# Patient Record
Sex: FEMALE | Race: WHITE | Employment: FULL TIME | URBAN - NONMETROPOLITAN AREA
[De-identification: names, ages, dates, MRNs, and addresses within clinical notes are randomized per-mention and may not be internally consistent; named-entity substitution may affect disease eponyms.]

---

## 2018-11-08 LAB
CREATININE: 0.6 MG/DL
POTASSIUM (K+): 3.9

## 2019-08-20 ENCOUNTER — OFFICE VISIT (OUTPATIENT)
Dept: PRIMARY CARE CLINIC | Age: 39
End: 2019-08-20
Payer: COMMERCIAL

## 2019-08-20 VITALS
DIASTOLIC BLOOD PRESSURE: 90 MMHG | SYSTOLIC BLOOD PRESSURE: 145 MMHG | HEIGHT: 71 IN | OXYGEN SATURATION: 98 % | TEMPERATURE: 98.3 F | BODY MASS INDEX: 29.12 KG/M2 | HEART RATE: 77 BPM | WEIGHT: 208 LBS

## 2019-08-20 DIAGNOSIS — R53.83 FATIGUE DUE TO DEPRESSION: Primary | ICD-10-CM

## 2019-08-20 DIAGNOSIS — R63.5 WEIGHT GAIN: ICD-10-CM

## 2019-08-20 DIAGNOSIS — K21.9 GASTROESOPHAGEAL REFLUX DISEASE, ESOPHAGITIS PRESENCE NOT SPECIFIED: ICD-10-CM

## 2019-08-20 DIAGNOSIS — R68.84 JAW PAIN: ICD-10-CM

## 2019-08-20 DIAGNOSIS — F34.1 DYSTHYMIC DISORDER: ICD-10-CM

## 2019-08-20 DIAGNOSIS — E78.2 MIXED HYPERLIPIDEMIA: ICD-10-CM

## 2019-08-20 DIAGNOSIS — F32.A FATIGUE DUE TO DEPRESSION: Primary | ICD-10-CM

## 2019-08-20 DIAGNOSIS — F41.9 ANXIETY: ICD-10-CM

## 2019-08-20 DIAGNOSIS — J45.40 MODERATE PERSISTENT ASTHMA WITHOUT COMPLICATION: ICD-10-CM

## 2019-08-20 DIAGNOSIS — I87.2 VENOUS INSUFFICIENCY: ICD-10-CM

## 2019-08-20 DIAGNOSIS — E55.9 VITAMIN D DEFICIENCY: ICD-10-CM

## 2019-08-20 DIAGNOSIS — I10 ESSENTIAL HYPERTENSION: ICD-10-CM

## 2019-08-20 PROCEDURE — 99214 OFFICE O/P EST MOD 30 MIN: CPT | Performed by: FAMILY MEDICINE

## 2019-08-20 RX ORDER — OMEPRAZOLE 40 MG/1
40 CAPSULE, DELAYED RELEASE ORAL DAILY
Qty: 30 CAPSULE | Refills: 5 | Status: SHIPPED | OUTPATIENT
Start: 2019-08-20 | End: 2020-02-04 | Stop reason: SDUPTHER

## 2019-08-20 RX ORDER — ALBUTEROL SULFATE 90 UG/1
2 AEROSOL, METERED RESPIRATORY (INHALATION)
COMMUNITY
End: 2019-08-20 | Stop reason: SDUPTHER

## 2019-08-20 RX ORDER — ALBUTEROL SULFATE 90 UG/1
2 AEROSOL, METERED RESPIRATORY (INHALATION) EVERY 6 HOURS PRN
Qty: 1 INHALER | Refills: 5 | Status: SHIPPED | OUTPATIENT
Start: 2019-08-20 | End: 2020-08-13 | Stop reason: SDUPTHER

## 2019-08-20 RX ORDER — CYCLOBENZAPRINE HCL 10 MG
10 TABLET ORAL NIGHTLY PRN
Qty: 30 TABLET | Refills: 0 | Status: SHIPPED | OUTPATIENT
Start: 2019-08-20 | End: 2019-08-30

## 2019-08-20 RX ORDER — LISINOPRIL 10 MG/1
10 TABLET ORAL DAILY
Qty: 30 TABLET | Refills: 5 | Status: SHIPPED | OUTPATIENT
Start: 2019-08-20 | End: 2020-02-04 | Stop reason: SDUPTHER

## 2019-08-20 RX ORDER — BUPROPION HYDROCHLORIDE 200 MG/1
TABLET, EXTENDED RELEASE ORAL
Qty: 60 TABLET | Refills: 3 | Status: SHIPPED | OUTPATIENT
Start: 2019-08-20 | End: 2020-02-04 | Stop reason: SDUPTHER

## 2019-08-20 RX ORDER — ALBUTEROL SULFATE 2.5 MG/3ML
SOLUTION RESPIRATORY (INHALATION)
COMMUNITY
Start: 2019-02-22 | End: 2019-08-20

## 2019-08-20 RX ORDER — LISINOPRIL 10 MG/1
10 TABLET ORAL
COMMUNITY
Start: 2016-11-16 | End: 2019-08-20 | Stop reason: SDUPTHER

## 2019-08-20 RX ORDER — OMEPRAZOLE 40 MG/1
40 CAPSULE, DELAYED RELEASE ORAL
COMMUNITY
Start: 2016-11-16 | End: 2019-08-20 | Stop reason: SDUPTHER

## 2019-08-20 ASSESSMENT — ENCOUNTER SYMPTOMS
NAUSEA: 0
ABDOMINAL DISTENTION: 0
CONSTIPATION: 0
SHORTNESS OF BREATH: 0
COUGH: 0
CHEST TIGHTNESS: 0
SORE THROAT: 0
SINUS PRESSURE: 0
WHEEZING: 0
EYE REDNESS: 0
VOMITING: 0
COLOR CHANGE: 0
SINUS PAIN: 0
PHOTOPHOBIA: 0
ABDOMINAL PAIN: 0
BACK PAIN: 0
BLOOD IN STOOL: 0
EYE PAIN: 0
DIARRHEA: 0
EYE ITCHING: 0

## 2019-08-20 ASSESSMENT — PATIENT HEALTH QUESTIONNAIRE - PHQ9
2. FEELING DOWN, DEPRESSED OR HOPELESS: 0
SUM OF ALL RESPONSES TO PHQ QUESTIONS 1-9: 0
SUM OF ALL RESPONSES TO PHQ QUESTIONS 1-9: 0
1. LITTLE INTEREST OR PLEASURE IN DOING THINGS: 0
SUM OF ALL RESPONSES TO PHQ9 QUESTIONS 1 & 2: 0

## 2019-08-21 LAB
T4 FREE: 1.01
TSH SERPL DL<=0.05 MIU/L-ACNC: 4.43 UIU/ML

## 2019-08-23 DIAGNOSIS — E03.8 OTHER SPECIFIED HYPOTHYROIDISM: Primary | ICD-10-CM

## 2019-08-23 RX ORDER — LEVOTHYROXINE SODIUM 0.03 MG/1
25 TABLET ORAL DAILY
Qty: 30 TABLET | Refills: 5 | Status: SHIPPED | OUTPATIENT
Start: 2019-08-23 | End: 2020-02-04 | Stop reason: SDUPTHER

## 2019-09-05 ENCOUNTER — TELEPHONE (OUTPATIENT)
Dept: PRIMARY CARE CLINIC | Age: 39
End: 2019-09-05

## 2019-09-05 DIAGNOSIS — E03.8 OTHER SPECIFIED HYPOTHYROIDISM: ICD-10-CM

## 2019-09-06 DIAGNOSIS — E03.8 OTHER SPECIFIED HYPOTHYROIDISM: Primary | ICD-10-CM

## 2019-09-25 RX ORDER — PREDNISONE 20 MG/1
20 TABLET ORAL DAILY
COMMUNITY
End: 2019-10-02

## 2019-09-25 RX ORDER — AZITHROMYCIN 250 MG/1
250 TABLET, FILM COATED ORAL DAILY
COMMUNITY
End: 2019-10-02

## 2019-09-25 RX ORDER — CEFDINIR 300 MG/1
300 CAPSULE ORAL 2 TIMES DAILY
COMMUNITY
End: 2019-10-02

## 2019-09-30 ENCOUNTER — TELEPHONE (OUTPATIENT)
Dept: PRIMARY CARE CLINIC | Age: 39
End: 2019-09-30

## 2019-09-30 DIAGNOSIS — E03.9 HYPOTHYROIDISM, UNSPECIFIED TYPE: Primary | ICD-10-CM

## 2019-09-30 RX ORDER — MULTIVITAMIN
1 CAPSULE ORAL DAILY
COMMUNITY
End: 2019-10-02

## 2019-10-02 ENCOUNTER — OFFICE VISIT (OUTPATIENT)
Dept: PRIMARY CARE CLINIC | Age: 39
End: 2019-10-02
Payer: COMMERCIAL

## 2019-10-02 VITALS
WEIGHT: 197.8 LBS | BODY MASS INDEX: 27.59 KG/M2 | DIASTOLIC BLOOD PRESSURE: 78 MMHG | HEART RATE: 76 BPM | TEMPERATURE: 98.2 F | OXYGEN SATURATION: 98 % | SYSTOLIC BLOOD PRESSURE: 116 MMHG

## 2019-10-02 DIAGNOSIS — K21.9 GASTROESOPHAGEAL REFLUX DISEASE, ESOPHAGITIS PRESENCE NOT SPECIFIED: ICD-10-CM

## 2019-10-02 DIAGNOSIS — J45.40 MODERATE PERSISTENT ASTHMA WITHOUT COMPLICATION: ICD-10-CM

## 2019-10-02 DIAGNOSIS — J40 BRONCHITIS: ICD-10-CM

## 2019-10-02 DIAGNOSIS — E55.9 VITAMIN D DEFICIENCY: ICD-10-CM

## 2019-10-02 DIAGNOSIS — F34.1 DYSTHYMIC DISORDER: ICD-10-CM

## 2019-10-02 DIAGNOSIS — I10 ESSENTIAL HYPERTENSION: Primary | ICD-10-CM

## 2019-10-02 DIAGNOSIS — I87.2 VENOUS INSUFFICIENCY: ICD-10-CM

## 2019-10-02 DIAGNOSIS — J01.80 ACUTE NON-RECURRENT SINUSITIS OF OTHER SINUS: ICD-10-CM

## 2019-10-02 DIAGNOSIS — E03.8 OTHER SPECIFIED HYPOTHYROIDISM: ICD-10-CM

## 2019-10-02 DIAGNOSIS — R63.5 WEIGHT GAIN: ICD-10-CM

## 2019-10-02 DIAGNOSIS — F41.9 ANXIETY: ICD-10-CM

## 2019-10-02 DIAGNOSIS — E78.2 MIXED HYPERLIPIDEMIA: ICD-10-CM

## 2019-10-02 DIAGNOSIS — E03.9 HYPOTHYROIDISM, UNSPECIFIED TYPE: ICD-10-CM

## 2019-10-02 PROBLEM — J01.90 ACUTE INFECTION OF NASAL SINUS: Status: ACTIVE | Noted: 2019-10-02

## 2019-10-02 LAB
T4 FREE: NORMAL
TSH SERPL DL<=0.05 MIU/L-ACNC: NORMAL UIU/ML

## 2019-10-02 PROCEDURE — 99214 OFFICE O/P EST MOD 30 MIN: CPT | Performed by: FAMILY MEDICINE

## 2019-10-02 PROCEDURE — G8484 FLU IMMUNIZE NO ADMIN: HCPCS | Performed by: FAMILY MEDICINE

## 2019-10-02 PROCEDURE — 1036F TOBACCO NON-USER: CPT | Performed by: FAMILY MEDICINE

## 2019-10-02 PROCEDURE — G8427 DOCREV CUR MEDS BY ELIG CLIN: HCPCS | Performed by: FAMILY MEDICINE

## 2019-10-02 PROCEDURE — G8419 CALC BMI OUT NRM PARAM NOF/U: HCPCS | Performed by: FAMILY MEDICINE

## 2019-10-02 RX ORDER — AZITHROMYCIN 250 MG/1
250 TABLET, FILM COATED ORAL SEE ADMIN INSTRUCTIONS
Qty: 6 TABLET | Refills: 0 | Status: SHIPPED | OUTPATIENT
Start: 2019-10-02 | End: 2019-10-02 | Stop reason: SDUPTHER

## 2019-10-02 RX ORDER — METHYLPREDNISOLONE 4 MG/1
TABLET ORAL
Qty: 21 TABLET | Refills: 0 | Status: SHIPPED | OUTPATIENT
Start: 2019-10-02 | End: 2019-10-02 | Stop reason: SDUPTHER

## 2019-10-02 RX ORDER — AZITHROMYCIN 250 MG/1
250 TABLET, FILM COATED ORAL SEE ADMIN INSTRUCTIONS
Qty: 6 TABLET | Refills: 0 | Status: SHIPPED | OUTPATIENT
Start: 2019-10-02 | End: 2019-10-03 | Stop reason: SDUPTHER

## 2019-10-02 RX ORDER — METHYLPREDNISOLONE 4 MG/1
TABLET ORAL
Qty: 21 TABLET | Refills: 0 | Status: SHIPPED | OUTPATIENT
Start: 2019-10-02 | End: 2019-10-03 | Stop reason: SDUPTHER

## 2019-10-02 ASSESSMENT — ENCOUNTER SYMPTOMS
NAUSEA: 0
DIARRHEA: 0
EYE ITCHING: 0
VOMITING: 0
EYE PAIN: 0
PHOTOPHOBIA: 0
CONSTIPATION: 0
SINUS PAIN: 1
SHORTNESS OF BREATH: 0
CHEST TIGHTNESS: 1
COUGH: 1
BACK PAIN: 0
WHEEZING: 0
BLOOD IN STOOL: 0
ABDOMINAL PAIN: 0
SORE THROAT: 0
ABDOMINAL DISTENTION: 0
SINUS PRESSURE: 1
EYE REDNESS: 0
COLOR CHANGE: 0

## 2019-10-03 DIAGNOSIS — J40 BRONCHITIS: ICD-10-CM

## 2019-10-03 DIAGNOSIS — J01.80 ACUTE NON-RECURRENT SINUSITIS OF OTHER SINUS: ICD-10-CM

## 2019-10-03 RX ORDER — METHYLPREDNISOLONE 4 MG/1
TABLET ORAL
Qty: 21 TABLET | Refills: 0 | Status: SHIPPED | OUTPATIENT
Start: 2019-10-03 | End: 2019-10-09

## 2019-10-03 RX ORDER — AZITHROMYCIN 250 MG/1
250 TABLET, FILM COATED ORAL SEE ADMIN INSTRUCTIONS
Qty: 6 TABLET | Refills: 0 | Status: SHIPPED | OUTPATIENT
Start: 2019-10-03 | End: 2019-10-08

## 2019-11-06 LAB
ALBUMIN SERPL-MCNC: 4.3 G/DL
ALP BLD-CCNC: 66 U/L
ALT SERPL-CCNC: 29 U/L
ANION GAP SERPL CALCULATED.3IONS-SCNC: 1.4 MMOL/L
AST SERPL-CCNC: 21 U/L
BILIRUB SERPL-MCNC: 0.7 MG/DL (ref 0.1–1.4)
BUN BLDV-MCNC: 17 MG/DL
CALCIUM SERPL-MCNC: 9.2 MG/DL
CHLORIDE BLD-SCNC: 105 MMOL/L
CO2: 25 MMOL/L
CREAT SERPL-MCNC: 0.8 MG/DL
GFR CALCULATED: 80
GLUCOSE BLD-MCNC: 91 MG/DL
POTASSIUM SERPL-SCNC: 4.1 MMOL/L
SODIUM BLD-SCNC: 138 MMOL/L
TOTAL PROTEIN: 7.3

## 2019-12-23 ENCOUNTER — OFFICE VISIT (OUTPATIENT)
Dept: FAMILY MEDICINE CLINIC | Age: 39
End: 2019-12-23
Payer: COMMERCIAL

## 2019-12-23 VITALS
HEART RATE: 79 BPM | OXYGEN SATURATION: 98 % | HEIGHT: 71 IN | BODY MASS INDEX: 28.14 KG/M2 | WEIGHT: 201 LBS | TEMPERATURE: 98.7 F | RESPIRATION RATE: 18 BRPM | DIASTOLIC BLOOD PRESSURE: 78 MMHG | SYSTOLIC BLOOD PRESSURE: 130 MMHG

## 2019-12-23 DIAGNOSIS — J06.9 UPPER RESPIRATORY TRACT INFECTION, UNSPECIFIED TYPE: Primary | ICD-10-CM

## 2019-12-23 PROCEDURE — 1036F TOBACCO NON-USER: CPT | Performed by: PHYSICIAN ASSISTANT

## 2019-12-23 PROCEDURE — G8419 CALC BMI OUT NRM PARAM NOF/U: HCPCS | Performed by: PHYSICIAN ASSISTANT

## 2019-12-23 PROCEDURE — 99213 OFFICE O/P EST LOW 20 MIN: CPT | Performed by: PHYSICIAN ASSISTANT

## 2019-12-23 PROCEDURE — G8484 FLU IMMUNIZE NO ADMIN: HCPCS | Performed by: PHYSICIAN ASSISTANT

## 2019-12-23 PROCEDURE — G8427 DOCREV CUR MEDS BY ELIG CLIN: HCPCS | Performed by: PHYSICIAN ASSISTANT

## 2019-12-23 RX ORDER — AZITHROMYCIN 250 MG/1
250 TABLET, FILM COATED ORAL SEE ADMIN INSTRUCTIONS
Qty: 6 TABLET | Refills: 0 | Status: SHIPPED | OUTPATIENT
Start: 2019-12-23 | End: 2019-12-28

## 2020-02-04 ENCOUNTER — OFFICE VISIT (OUTPATIENT)
Dept: PRIMARY CARE CLINIC | Age: 40
End: 2020-02-04
Payer: COMMERCIAL

## 2020-02-04 VITALS
OXYGEN SATURATION: 97 % | TEMPERATURE: 97.5 F | BODY MASS INDEX: 28.56 KG/M2 | DIASTOLIC BLOOD PRESSURE: 70 MMHG | HEART RATE: 85 BPM | WEIGHT: 204 LBS | SYSTOLIC BLOOD PRESSURE: 110 MMHG | RESPIRATION RATE: 18 BRPM | HEIGHT: 71 IN

## 2020-02-04 PROBLEM — S60.452A FOREIGN BODY OF RIGHT MIDDLE FINGER: Status: ACTIVE | Noted: 2020-02-04

## 2020-02-04 PROCEDURE — 99215 OFFICE O/P EST HI 40 MIN: CPT | Performed by: FAMILY MEDICINE

## 2020-02-04 PROCEDURE — G8419 CALC BMI OUT NRM PARAM NOF/U: HCPCS | Performed by: FAMILY MEDICINE

## 2020-02-04 PROCEDURE — G8427 DOCREV CUR MEDS BY ELIG CLIN: HCPCS | Performed by: FAMILY MEDICINE

## 2020-02-04 PROCEDURE — 1036F TOBACCO NON-USER: CPT | Performed by: FAMILY MEDICINE

## 2020-02-04 PROCEDURE — G8484 FLU IMMUNIZE NO ADMIN: HCPCS | Performed by: FAMILY MEDICINE

## 2020-02-04 RX ORDER — ALPRAZOLAM 0.5 MG/1
TABLET ORAL
Qty: 4 TABLET | Refills: 0 | Status: SHIPPED | OUTPATIENT
Start: 2020-02-04 | End: 2020-02-06

## 2020-02-04 RX ORDER — OMEPRAZOLE 40 MG/1
40 CAPSULE, DELAYED RELEASE ORAL DAILY
Qty: 30 CAPSULE | Refills: 5 | Status: SHIPPED | OUTPATIENT
Start: 2020-02-04 | End: 2020-08-13 | Stop reason: SDUPTHER

## 2020-02-04 RX ORDER — LEVOTHYROXINE SODIUM 0.03 MG/1
25 TABLET ORAL DAILY
Qty: 30 TABLET | Refills: 5 | Status: SHIPPED | OUTPATIENT
Start: 2020-02-04 | End: 2020-08-13 | Stop reason: SDUPTHER

## 2020-02-04 RX ORDER — LISINOPRIL 10 MG/1
10 TABLET ORAL DAILY
Qty: 30 TABLET | Refills: 5 | Status: SHIPPED | OUTPATIENT
Start: 2020-02-04 | End: 2020-08-13 | Stop reason: SDUPTHER

## 2020-02-04 RX ORDER — BUPROPION HYDROCHLORIDE 200 MG/1
TABLET, EXTENDED RELEASE ORAL
Qty: 60 TABLET | Refills: 3 | Status: SHIPPED | OUTPATIENT
Start: 2020-02-04 | End: 2020-07-13

## 2020-02-04 ASSESSMENT — ENCOUNTER SYMPTOMS
EYE ITCHING: 0
CONSTIPATION: 0
ABDOMINAL PAIN: 0
EYE PAIN: 0
DIARRHEA: 0
EYE REDNESS: 0
BACK PAIN: 0
PHOTOPHOBIA: 0
NAUSEA: 0
COLOR CHANGE: 0
VOMITING: 0
SHORTNESS OF BREATH: 0
ABDOMINAL DISTENTION: 0
SORE THROAT: 0
BLOOD IN STOOL: 0
WHEEZING: 0

## 2020-02-04 NOTE — PROGRESS NOTES
[facility]) Physicians   Internal Medicine     2020  Johan Vasquez : 1980 Sex: female  Age:39 y.o. Chief Complaint   Patient presents with    Hypertension     4  month visit and med refills        Hypertension   Pertinent negatives include no chest pain, headaches, neck pain, palpitations or shortness of breath. Patient presents for check up and f/u depression/anxiety. patient with h/o HTN, asthma,  Hyperlipidemia. Currently taking wellbutrin sr 200 mg daily, anxiety has been heightened recently due to upcoming trip to [place], patient states fearful of flying.   h/o htn--bp well controlled, cont same. H/o  hypothyroidism, currently taking synthroid 25 mcg daily  h/o allergies and asthma--stable  anterior tibial occlusion, was following with dr. Elisabeth Cassidy, per patient does not need to follow up with him  Weight up 3 pounds since last ov, discussed need for more approp diet and exercise  Discussed 12 hour fast.  Reviewed bw results in detail, chol more elevated than previous, patient admits to not following approp diet and not exercising. Patient states she got a jagger stuck in her right middle finger approx 4 months ago, over past month has become more painful, thought she retrieved all of jagger when incident occurred. Review of Systems   Constitutional: Negative for appetite change, fatigue, fever and unexpected weight change. HENT: Negative for congestion, ear discharge, ear pain, hearing loss, mouth sores, sneezing and sore throat. Eyes: Negative for photophobia, pain, redness and itching. Respiratory: Negative for shortness of breath and wheezing. Asthma   Cardiovascular: Negative for chest pain, palpitations and leg swelling. Hyperlipidimea   Gastrointestinal: Negative for abdominal distention, abdominal pain, blood in stool, constipation, diarrhea, nausea and vomiting. Gerd   Endocrine: Negative for cold intolerance and heat intolerance. Genitourinary: Negative for difficulty urinating, dysuria, frequency, hematuria and urgency. Musculoskeletal: Negative for arthralgias, back pain, joint swelling, myalgias, neck pain and neck stiffness. Skin: Negative for color change, pallor and wound. Allergic/Immunologic: Negative for environmental allergies and food allergies. Neurological: Negative for dizziness, seizures, syncope, weakness, light-headedness and headaches. Hematological: Negative for adenopathy. Psychiatric/Behavioral: Positive for dysphoric mood and sleep disturbance. Negative for agitation, confusion and suicidal ideas. The patient is nervous/anxious. The patient is not hyperactive. Current Outpatient Medications:     buPROPion (WELLBUTRIN SR) 200 MG extended release tablet, 1 po daily for 1 week, then 1 po bid, Disp: 60 tablet, Rfl: 3    levothyroxine (SYNTHROID) 25 MCG tablet, Take 1 tablet by mouth daily, Disp: 30 tablet, Rfl: 5    lisinopril (PRINIVIL;ZESTRIL) 10 MG tablet, Take 1 tablet by mouth daily, Disp: 30 tablet, Rfl: 5    omeprazole (PRILOSEC) 40 MG delayed release capsule, Take 1 capsule by mouth daily, Disp: 30 capsule, Rfl: 5    ALPRAZolam (XANAX) 0.5 MG tablet, 1-2 po 30 minutes prior to flight, Disp: 4 tablet, Rfl: 0    albuterol sulfate  (90 Base) MCG/ACT inhaler, Inhale 2 puffs into the lungs every 6 hours as needed for Wheezing, Disp: 1 Inhaler, Rfl: 5    albuterol (PROVENTIL) (2.5 MG/3ML) 0.083% nebulizer solution, , Disp: , Rfl: 0    Allergies   Allergen Reactions    Aspirin      Told not to take b/c of asthma    Penicillins Rash       Past Medical History:   Diagnosis Date    Acute headache     Anemia     Anxiety     Asthma     GERD (gastroesophageal reflux disease)     Hyperlipidemia     Hypertension     Venous insufficiency     Vitamin D deficiency        No past surgical history on file.     Family History   Problem Relation Age of Onset    Heart Disease Paternal Grandfather        Social History     Socioeconomic History    Marital status:      Spouse name: Not on file    Number of children: Not on file    Years of education: Not on file    Highest education level: Not on file   Occupational History    Not on file   Social Needs    Financial resource strain: Not on file    Food insecurity:     Worry: Not on file     Inability: Not on file    Transportation needs:     Medical: Not on file     Non-medical: Not on file   Tobacco Use    Smoking status: Never Smoker    Smokeless tobacco: Never Used   Substance and Sexual Activity    Alcohol use: Never     Frequency: Never    Drug use: Never    Sexual activity: Yes     Partners: Male     Comment:  had vasectomy   Lifestyle    Physical activity:     Days per week: Not on file     Minutes per session: Not on file    Stress: Not on file   Relationships    Social connections:     Talks on phone: Not on file     Gets together: Not on file     Attends Synagogue service: Not on file     Active member of club or organization: Not on file     Attends meetings of clubs or organizations: Not on file     Relationship status: Not on file    Intimate partner violence:     Fear of current or ex partner: Not on file     Emotionally abused: Not on file     Physically abused: Not on file     Forced sexual activity: Not on file   Other Topics Concern    Not on file   Social History Narrative    Not on file       Vitals:    02/04/20 1520   BP: 110/70   Pulse: 85   Resp: 18   Temp: 97.5 °F (36.4 °C)   TempSrc: Temporal   SpO2: 97%   Weight: 204 lb (92.5 kg)   Height: 5' 11\" (1.803 m)       Exam:  Physical Exam  Constitutional:       Appearance: She is well-developed. Comments: Appears overweight   HENT:      Head: Normocephalic and atraumatic.       Right Ear: External ear normal.      Left Ear: External ear normal.   Eyes:      Conjunctiva/sclera: Conjunctivae normal.      Pupils: Pupils are equal, round, and reactive to light. Neck:      Musculoskeletal: Normal range of motion and neck supple. Cardiovascular:      Rate and Rhythm: Normal rate and regular rhythm. Heart sounds: Normal heart sounds. Pulmonary:      Effort: Pulmonary effort is normal.   Abdominal:      General: Bowel sounds are normal.      Palpations: Abdomen is soft. Skin:     General: Skin is warm and dry. Findings: No rash. Comments: Elevated skin noted medial middle right finger with ttp, no openings, no fluctuance   Neurological:      Mental Status: She is alert and oriented to person, place, and time.          Assessment and Plan:    Problem List        Circulatory    Hypertension    Relevant Medications    lisinopril (PRINIVIL;ZESTRIL) 10 MG tablet    Other Relevant Orders    Comprehensive Metabolic Panel    CBC Auto Differential    Urinalysis       Digestive    GERD (gastroesophageal reflux disease)    Relevant Medications    omeprazole (PRILOSEC) 40 MG delayed release capsule       Endocrine    Other specified hypothyroidism    Relevant Medications    levothyroxine (SYNTHROID) 25 MCG tablet    Other Relevant Orders    TSH without Reflex    T4, Free       Other    Anxiety    Relevant Medications    buPROPion (WELLBUTRIN SR) 200 MG extended release tablet    ALPRAZolam (XANAX) 0.5 MG tablet    Hyperlipidemia    Relevant Medications    lisinopril (PRINIVIL;ZESTRIL) 10 MG tablet    Other Relevant Orders    Comprehensive Metabolic Panel    Lipid Panel    Foreign body of right middle finger - Primary    Relevant Orders    External Referral To General Surgery      see above discussion regarding bw and need for weight loss, approp diet  Depression stable, anxiety has heightened due to upcoming trip--will give limited supply of xanax--discussed sedating properties of medication, oarrs reviewed  Cont to follow with dr. Brendan Luog for gyn care  Referral placed to dr. Baudilio Calhoun for removal of foreign body      Return in about 6 months (around

## 2020-02-18 ENCOUNTER — TELEPHONE (OUTPATIENT)
Dept: PRIMARY CARE CLINIC | Age: 40
End: 2020-02-18

## 2020-02-18 NOTE — TELEPHONE ENCOUNTER
Patient called because she has not heard anymore about referral for her finger. Informed her new referral for Dr. Thu Tejada is in system. Faxed over to Dr. Lake Hernandez office at [phone].         Electronically signed by Deedee Sorensen LPN on 2/73/94 at 3:49 AM

## 2020-05-11 ENCOUNTER — TELEPHONE (OUTPATIENT)
Dept: PRIMARY CARE CLINIC | Age: 40
End: 2020-05-11

## 2020-05-11 ENCOUNTER — OFFICE VISIT (OUTPATIENT)
Dept: PRIMARY CARE CLINIC | Age: 40
End: 2020-05-11
Payer: COMMERCIAL

## 2020-05-11 VITALS
RESPIRATION RATE: 16 BRPM | TEMPERATURE: 97.9 F | BODY MASS INDEX: 25.34 KG/M2 | HEIGHT: 71 IN | OXYGEN SATURATION: 98 % | WEIGHT: 181 LBS | SYSTOLIC BLOOD PRESSURE: 128 MMHG | HEART RATE: 93 BPM | DIASTOLIC BLOOD PRESSURE: 74 MMHG

## 2020-05-11 PROCEDURE — G8419 CALC BMI OUT NRM PARAM NOF/U: HCPCS | Performed by: NURSE PRACTITIONER

## 2020-05-11 PROCEDURE — G8427 DOCREV CUR MEDS BY ELIG CLIN: HCPCS | Performed by: NURSE PRACTITIONER

## 2020-05-11 PROCEDURE — 99213 OFFICE O/P EST LOW 20 MIN: CPT | Performed by: NURSE PRACTITIONER

## 2020-05-11 PROCEDURE — 1036F TOBACCO NON-USER: CPT | Performed by: NURSE PRACTITIONER

## 2020-05-11 RX ORDER — ALPRAZOLAM 0.5 MG/1
0.5 TABLET ORAL 2 TIMES DAILY PRN
Qty: 4 TABLET | Refills: 0 | Status: SHIPPED
Start: 2020-05-11 | End: 2020-05-14 | Stop reason: SDUPTHER

## 2020-05-11 ASSESSMENT — ENCOUNTER SYMPTOMS
PHOTOPHOBIA: 0
ABDOMINAL DISTENTION: 0
NAUSEA: 0
WHEEZING: 0
DIARRHEA: 0
SORE THROAT: 0
BACK PAIN: 0
ABDOMINAL PAIN: 0
EYE PAIN: 0
COLOR CHANGE: 0
EYE REDNESS: 0
CONSTIPATION: 0
EYE ITCHING: 0
VOMITING: 0
SHORTNESS OF BREATH: 0
BLOOD IN STOOL: 0

## 2020-05-11 NOTE — TELEPHONE ENCOUNTER
I don't think that would affect the symptoms we discussed. However, we can do a blood test to screen her, or we could wait until her next labwork is due to screen as well.

## 2020-05-14 ENCOUNTER — PATIENT MESSAGE (OUTPATIENT)
Dept: PRIMARY CARE CLINIC | Age: 40
End: 2020-05-14

## 2020-05-14 RX ORDER — ALPRAZOLAM 0.5 MG/1
0.5 TABLET ORAL 2 TIMES DAILY PRN
Qty: 2 TABLET | Refills: 0 | Status: SHIPPED | OUTPATIENT
Start: 2020-05-14 | End: 2020-06-13

## 2020-07-01 ENCOUNTER — HOSPITAL ENCOUNTER (OUTPATIENT)
Age: 40
Discharge: HOME | End: 2020-07-03
Payer: COMMERCIAL

## 2020-07-01 PROCEDURE — 87186 SC STD MICRODIL/AGAR DIL: CPT

## 2020-07-01 PROCEDURE — 87088 URINE BACTERIA CULTURE: CPT

## 2020-07-04 LAB
ORGANISM: ABNORMAL
URINE CULTURE, ROUTINE: ABNORMAL
URINE CULTURE, ROUTINE: ABNORMAL

## 2020-07-27 LAB
A/G RATIO: 1.6 RATIO (ref 1.1–2.2)
ALBUMIN SERPL-MCNC: 4.5 G/DL (ref 3.4–4.8)
ALP BLD-CCNC: 47 U/L (ref 42–121)
ALT SERPL-CCNC: 16 U/L (ref 10–54)
ANION GAP SERPL CALCULATED.3IONS-SCNC: 7 MEQ/L (ref 3–11)
APPEARANCE, BODY FLUID: CLEAR
AST SERPL-CCNC: 13 U/L (ref 10–41)
BASOPHILS ABSOLUTE: 0 K/UL (ref 0–0.2)
BASOPHILS RELATIVE PERCENT: 0.8 % (ref 0–1.5)
BILIRUB SERPL-MCNC: 0.6 MG/DL (ref 0.3–1.5)
BILIRUBIN URINE: NEGATIVE
BUN BLDV-MCNC: 16 MG/DL (ref 8–21)
CALCIUM SERPL-MCNC: 9 MG/DL (ref 8.5–10.5)
CHLORIDE BLD-SCNC: 105 MEQ/L (ref 98–107)
CHOLESTEROL: 193 MG/DL (ref 140–200)
CO2: 25 MEQ/L (ref 21–31)
COLOR, URINE: YELLOW
CREAT SERPL-MCNC: 0.7 MG/DL (ref 0.4–1)
CREATININE + EGFR PANEL: 112 ML/MIN
EOSINOPHILS ABSOLUTE: 0.1 K/UL (ref 0–0.33)
EOSINOPHILS RELATIVE PERCENT: 1.5 % (ref 0–3)
GFR NON-AFRICAN AMERICAN: 93 ML/MIN
GLOBULIN: 2.8 G/DL (ref 1.9–3.9)
GLUCOSE BLD-MCNC: 92 MG/DL (ref 70–99)
GLUCOSE URINE: NEGATIVE MG/DL
HCT VFR BLD CALC: 35.7 % (ref 36–44)
HDLC SERPL-MCNC: 70 MG/DL (ref 35–85)
HEMOGLOBIN: 12.1 G/DL (ref 12–15)
KETONES, URINE: NEGATIVE MG/DL
LDL CHOLESTEROL: 112 MG/DL
LDL/HDL RATIO: 1.6 RATIO
LYMPHOCYTES ABSOLUTE: 1.4 K/UL (ref 1.1–4.8)
LYMPHOCYTES RELATIVE PERCENT: 32.3 % (ref 24–44)
MCH RBC QN AUTO: 29.7 PG (ref 28–34)
MCHC RBC AUTO-ENTMCNC: 33.8 G/DL (ref 33–37)
MCV RBC AUTO: 87.7 FL (ref 80–100)
MONOCYTES ABSOLUTE: 0.4 K/UL (ref 0.2–0.7)
MONOCYTES RELATIVE PERCENT: 9.5 % (ref 3.4–9)
NEUTROPHILS ABSOLUTE: 2.4 K/UL (ref 1.83–8.7)
NITRATE, UA: NEGATIVE
PDW BLD-RTO: 13.8 % (ref 10.9–14.3)
PH UA: 6 (ref 4.6–8)
PLATELET # BLD: 175 K/UL (ref 150–450)
PMV BLD AUTO: 9.2 FL (ref 7.4–10.4)
POTASSIUM SERPL-SCNC: 3.8 MEQ/L (ref 3.6–5)
PROTEIN UA: NEGATIVE MG/DL
RBC # BLD: 4.07 M/UL (ref 4–4.9)
RBC URINE: NEGATIVE
SEGMENTED NEUTROPHILS RELATIVE PERCENT: 55.9 % (ref 40–74)
SODIUM BLD-SCNC: 137 MEQ/L (ref 135–145)
SPECIFIC GRAVITY, URINE: 1.02 (ref 1–1.03)
T4 FREE: 0.9 NG/DL (ref 0.61–1.12)
TOTAL PROTEIN: 7.3 G/DL (ref 5.9–7.8)
TRIGL SERPL-MCNC: 39 MG/DL (ref 41–189)
TSH SERPL DL<=0.05 MIU/L-ACNC: 1.87 UIU/ML (ref 0.34–5.6)
UROBILINOGEN, URINE: NEGATIVE MG/DL
WBC # BLD: 4.3 K/UL (ref 4.5–11)
WBC URINE: NEGATIVE

## 2020-08-13 ENCOUNTER — OFFICE VISIT (OUTPATIENT)
Dept: PRIMARY CARE CLINIC | Age: 40
End: 2020-08-13
Payer: COMMERCIAL

## 2020-08-13 VITALS
RESPIRATION RATE: 18 BRPM | HEART RATE: 70 BPM | TEMPERATURE: 97.6 F | SYSTOLIC BLOOD PRESSURE: 118 MMHG | HEIGHT: 71 IN | DIASTOLIC BLOOD PRESSURE: 76 MMHG | OXYGEN SATURATION: 98 % | BODY MASS INDEX: 22.82 KG/M2 | WEIGHT: 163 LBS

## 2020-08-13 PROCEDURE — 99213 OFFICE O/P EST LOW 20 MIN: CPT | Performed by: NURSE PRACTITIONER

## 2020-08-13 PROCEDURE — G8420 CALC BMI NORM PARAMETERS: HCPCS | Performed by: NURSE PRACTITIONER

## 2020-08-13 PROCEDURE — 1036F TOBACCO NON-USER: CPT | Performed by: NURSE PRACTITIONER

## 2020-08-13 PROCEDURE — G8427 DOCREV CUR MEDS BY ELIG CLIN: HCPCS | Performed by: NURSE PRACTITIONER

## 2020-08-13 RX ORDER — OMEPRAZOLE 40 MG/1
40 CAPSULE, DELAYED RELEASE ORAL DAILY
Qty: 30 CAPSULE | Refills: 5 | Status: SHIPPED | OUTPATIENT
Start: 2020-08-13

## 2020-08-13 RX ORDER — LEVOTHYROXINE SODIUM 0.03 MG/1
25 TABLET ORAL DAILY
Qty: 30 TABLET | Refills: 5 | Status: SHIPPED
Start: 2020-08-13 | End: 2021-07-26 | Stop reason: SDUPTHER

## 2020-08-13 RX ORDER — ALBUTEROL SULFATE 2.5 MG/3ML
2.5 SOLUTION RESPIRATORY (INHALATION) EVERY 6 HOURS PRN
Qty: 120 EACH | Refills: 0 | Status: SHIPPED | OUTPATIENT
Start: 2020-08-13

## 2020-08-13 RX ORDER — BUPROPION HYDROCHLORIDE 200 MG/1
200 TABLET, EXTENDED RELEASE ORAL 2 TIMES DAILY
Qty: 60 TABLET | Refills: 5 | Status: SHIPPED | OUTPATIENT
Start: 2020-08-13

## 2020-08-13 RX ORDER — LISINOPRIL 10 MG/1
10 TABLET ORAL DAILY
Qty: 30 TABLET | Refills: 5 | Status: SHIPPED
Start: 2020-08-13 | End: 2022-05-10

## 2020-08-13 RX ORDER — ALBUTEROL SULFATE 90 UG/1
2 AEROSOL, METERED RESPIRATORY (INHALATION) EVERY 6 HOURS PRN
Qty: 1 INHALER | Refills: 5 | Status: SHIPPED | OUTPATIENT
Start: 2020-08-13

## 2020-08-14 ASSESSMENT — ENCOUNTER SYMPTOMS
WHEEZING: 0
COLOR CHANGE: 0
BLOOD IN STOOL: 0
DIARRHEA: 0
CONSTIPATION: 0
NAUSEA: 0
EYE ITCHING: 0
PHOTOPHOBIA: 0
EYE REDNESS: 0
ABDOMINAL DISTENTION: 0
SORE THROAT: 0
VOMITING: 0
SHORTNESS OF BREATH: 0
ABDOMINAL PAIN: 0
BACK PAIN: 0
EYE PAIN: 0

## 2020-08-14 NOTE — PROGRESS NOTES
of Onset    Heart Disease Paternal Grandfather     Bleeding Prob Brother         MTHFR       Social History     Socioeconomic History    Marital status:      Spouse name: Not on file    Number of children: Not on file    Years of education: Not on file    Highest education level: Not on file   Occupational History    Not on file   Social Needs    Financial resource strain: Not on file    Food insecurity     Worry: Not on file     Inability: Not on file    Transportation needs     Medical: Not on file     Non-medical: Not on file   Tobacco Use    Smoking status: Never Smoker    Smokeless tobacco: Never Used   Substance and Sexual Activity    Alcohol use: Yes     Frequency: Monthly or less    Drug use: Never    Sexual activity: Yes     Partners: Male     Comment:  had vasectomy   Lifestyle    Physical activity     Days per week: Not on file     Minutes per session: Not on file    Stress: Not on file   Relationships    Social connections     Talks on phone: Not on file     Gets together: Not on file     Attends Yarsani service: Not on file     Active member of club or organization: Not on file     Attends meetings of clubs or organizations: Not on file     Relationship status: Not on file    Intimate partner violence     Fear of current or ex partner: Not on file     Emotionally abused: Not on file     Physically abused: Not on file     Forced sexual activity: Not on file   Other Topics Concern    Not on file   Social History Narrative    Not on file       Vitals:    08/13/20 1102   BP: 118/76   Site: Left Upper Arm   Position: Sitting   Cuff Size: Medium Adult   Pulse: 70   Resp: 18   Temp: 97.6 °F (36.4 °C)   TempSrc: Temporal   SpO2: 98%   Weight: 163 lb (73.9 kg)   Height: 5' 11\" (1.803 m)       Exam:  Physical Exam  Constitutional:       Appearance: She is well-developed. Comments: Appears overweight   HENT:      Head: Normocephalic and atraumatic.       Right Ear: External ear normal.      Left Ear: External ear normal.   Eyes:      Conjunctiva/sclera: Conjunctivae normal.      Pupils: Pupils are equal, round, and reactive to light. Neck:      Musculoskeletal: Normal range of motion and neck supple. Comments: There is a slight fullness over the right thyroid lobe, but no discrete nodules are able to be palpated. Swallows without any difficulty. No adenopathy. No skin changes. Cardiovascular:      Rate and Rhythm: Normal rate and regular rhythm. Heart sounds: Normal heart sounds. Pulmonary:      Effort: Pulmonary effort is normal.   Abdominal:      General: Bowel sounds are normal.      Palpations: Abdomen is soft. Skin:     General: Skin is warm and dry. Findings: No rash. Neurological:      Mental Status: She is alert and oriented to person, place, and time. Assessment and Plan:  Jose Morillo was seen today for establish care and medication refill. Diagnoses and all orders for this visit:    Gastroesophageal reflux disease, esophagitis presence not specified  -     omeprazole (PRILOSEC) 40 MG delayed release capsule; Take 1 capsule by mouth daily    Essential hypertension  -     lisinopril (PRINIVIL;ZESTRIL) 10 MG tablet; Take 1 tablet by mouth daily    Other specified hypothyroidism  -     levothyroxine (SYNTHROID) 25 MCG tablet; Take 1 tablet by mouth daily    Anxiety  -     buPROPion (WELLBUTRIN SR) 200 MG extended release tablet; Take 1 tablet by mouth 2 times daily TAKE 1 TABLET PO 1 TWICE DAILY    Moderate persistent asthma without complication  -     albuterol sulfate  (90 Base) MCG/ACT inhaler; Inhale 2 puffs into the lungs every 6 hours as needed for Wheezing    Other orders  -     albuterol (PROVENTIL) (2.5 MG/3ML) 0.083% nebulizer solution; Take 3 mLs by nebulization every 6 hours as needed for Wheezing      The patient will follow-up with specialists as above. She is to be seen back in the office in 6 months.   If she has any problems in the interim, she is to let me know. Return in about 6 months (around 2/13/2021). No orders of the defined types were placed in this encounter.       Heriberto Pelaez, APRN - CNP  8/14/2020  10:13 AM

## 2020-08-31 ENCOUNTER — OFFICE VISIT (OUTPATIENT)
Dept: FAMILY MEDICINE CLINIC | Age: 40
End: 2020-08-31
Payer: COMMERCIAL

## 2020-08-31 VITALS
TEMPERATURE: 97.8 F | WEIGHT: 170 LBS | HEART RATE: 97 BPM | SYSTOLIC BLOOD PRESSURE: 130 MMHG | OXYGEN SATURATION: 96 % | BODY MASS INDEX: 23.71 KG/M2 | DIASTOLIC BLOOD PRESSURE: 88 MMHG

## 2020-08-31 PROCEDURE — G8420 CALC BMI NORM PARAMETERS: HCPCS | Performed by: PHYSICIAN ASSISTANT

## 2020-08-31 PROCEDURE — 1036F TOBACCO NON-USER: CPT | Performed by: PHYSICIAN ASSISTANT

## 2020-08-31 PROCEDURE — G8427 DOCREV CUR MEDS BY ELIG CLIN: HCPCS | Performed by: PHYSICIAN ASSISTANT

## 2020-08-31 PROCEDURE — 99213 OFFICE O/P EST LOW 20 MIN: CPT | Performed by: PHYSICIAN ASSISTANT

## 2020-08-31 NOTE — PROGRESS NOTES
Chief Complaint:   Migraine (x 3 days )      History of Present Illness   Source of history provided by:  patient. Caridad Calderon is a 36 y.o. old female who has a past medical history of:   Past Medical History:   Diagnosis Date    Acute headache     Anemia     Anxiety     Asthma     GERD (gastroesophageal reflux disease)     Hyperlipidemia     Hypertension     Thyroid disorder     Venous insufficiency     Vitamin D deficiency       presents to the [facility] care for complaints of a headache which began 3 days ago. Patient states in the past she has had a history of migraines but does not feel like her typical migraine. Patient states the headache was located to the top of her head as well as right side. Patient states this is the worst headache she is ever had. Denies any vision or hearing changes. Positive nausea no vomiting. Denies any dizziness lightheadedness or syncope. Denies any numbness tingling weakness or paralysis or slurred speech or facial droop. Pt denies any recent falls, trauma, dizziness, syncope, CP, SOB, palpitations, nasal congestion, visual loss, unilateral weakness, fever, neck stiffness, or recent illness. ROS    Unless otherwise stated in this report or unable to obtain because of the patient's clinical or mental status as evidenced by the medical record, this patients's positive and negative responses for Review of Systems, constitutional, psych, eyes, ENT, cardiovascular, respiratory, gastrointestinal, neurological, genitourinary, musculoskeletal, integument systems and systems related to the presenting problem are either stated in the preceding or were not pertinent or were negative for the symptoms and/or complaints related to the medical problem. Past Surgical History:  has no past surgical history on file. Social History:  reports that she has never smoked. She has never used smokeless tobacco. She reports current alcohol use.  She reports that she does Disposition:  Disposition: to ER.

## 2020-09-18 ENCOUNTER — OFFICE VISIT (OUTPATIENT)
Dept: PRIMARY CARE CLINIC | Age: 40
End: 2020-09-18
Payer: COMMERCIAL

## 2020-09-18 VITALS
WEIGHT: 164 LBS | DIASTOLIC BLOOD PRESSURE: 86 MMHG | OXYGEN SATURATION: 97 % | SYSTOLIC BLOOD PRESSURE: 128 MMHG | HEIGHT: 71 IN | TEMPERATURE: 98.2 F | HEART RATE: 74 BPM | BODY MASS INDEX: 22.96 KG/M2 | RESPIRATION RATE: 18 BRPM

## 2020-09-18 PROCEDURE — G8420 CALC BMI NORM PARAMETERS: HCPCS | Performed by: NURSE PRACTITIONER

## 2020-09-18 PROCEDURE — 1036F TOBACCO NON-USER: CPT | Performed by: NURSE PRACTITIONER

## 2020-09-18 PROCEDURE — G8427 DOCREV CUR MEDS BY ELIG CLIN: HCPCS | Performed by: NURSE PRACTITIONER

## 2020-09-18 PROCEDURE — 99213 OFFICE O/P EST LOW 20 MIN: CPT | Performed by: NURSE PRACTITIONER

## 2020-09-18 ASSESSMENT — ENCOUNTER SYMPTOMS
ABDOMINAL DISTENTION: 0
DIARRHEA: 0
EYE REDNESS: 0
SHORTNESS OF BREATH: 0
VOMITING: 0
PHOTOPHOBIA: 0
ABDOMINAL PAIN: 0
NAUSEA: 0
EYE ITCHING: 0
SORE THROAT: 0
BLOOD IN STOOL: 0
COLOR CHANGE: 0
EYE PAIN: 0
BACK PAIN: 0
CONSTIPATION: 0
WHEEZING: 0

## 2020-09-18 NOTE — PROGRESS NOTES
[facility]) Physicians   Internal Medicine     2020  Rey Ying : 1980 Sex: female  Age:40 y.o. Chief Complaint   Patient presents with    Follow-Up from Hospital      Patient presents for ER follow-up. She was initially seen in [facility] for headache and then proceeded to the emergency room for further evaluation. Work-up there was negative but did not include imaging. Lab work looked good. This resolved with pharmacotherapy in the ER. The patient states she previously had migraines but this felt different. With the migraines, she did previously use Imitrex which seemed to exacerbate her symptoms somewhat. She is not had any migraine for quite some time, but since her visit in the ER on 2020, she has had 5 episodes of these headaches. She experiences a pounding sensation just posterior to the right ear as well as the left occiput region. She denies any visual changes. She had nausea with the initial headache but not since that time. These typically last about 1 to 1.5 days. She is taking Tylenol over-the-counter which does seem to help. She denies any systemic symptoms. Review of Systems   Constitutional: Negative for appetite change, fatigue, fever and unexpected weight change. HENT: Negative for congestion, ear discharge, ear pain, hearing loss, mouth sores, sneezing and sore throat. As above   Eyes: Negative for photophobia, pain, redness and itching. Respiratory: Negative for shortness of breath and wheezing. Asthma   Cardiovascular: Negative for chest pain, palpitations and leg swelling. Hyperlipidimea   Gastrointestinal: Negative for abdominal distention, abdominal pain, blood in stool, constipation, diarrhea, nausea and vomiting. Gerd   Endocrine: Negative for cold intolerance and heat intolerance. Genitourinary: Negative for difficulty urinating, dysuria, frequency, hematuria and urgency.    Musculoskeletal: Negative for arthralgias, back pain, joint swelling, myalgias, neck pain and neck stiffness. Skin: Negative for color change, pallor and wound. Allergic/Immunologic: Negative for environmental allergies and food allergies. Neurological: Negative for dizziness, seizures, syncope, weakness, light-headedness and headaches. Hematological: Negative for adenopathy. Psychiatric/Behavioral: Positive for dysphoric mood and sleep disturbance. Negative for agitation, confusion and suicidal ideas. The patient is nervous/anxious. The patient is not hyperactive. Current Outpatient Medications:     omeprazole (PRILOSEC) 40 MG delayed release capsule, Take 1 capsule by mouth daily, Disp: 30 capsule, Rfl: 5    lisinopril (PRINIVIL;ZESTRIL) 10 MG tablet, Take 1 tablet by mouth daily, Disp: 30 tablet, Rfl: 5    levothyroxine (SYNTHROID) 25 MCG tablet, Take 1 tablet by mouth daily, Disp: 30 tablet, Rfl: 5    buPROPion (WELLBUTRIN SR) 200 MG extended release tablet, Take 1 tablet by mouth 2 times daily TAKE 1 TABLET PO 1 TWICE DAILY, Disp: 60 tablet, Rfl: 5    albuterol sulfate  (90 Base) MCG/ACT inhaler, Inhale 2 puffs into the lungs every 6 hours as needed for Wheezing, Disp: 1 Inhaler, Rfl: 5    albuterol (PROVENTIL) (2.5 MG/3ML) 0.083% nebulizer solution, Take 3 mLs by nebulization every 6 hours as needed for Wheezing, Disp: 120 each, Rfl: 0    Allergies   Allergen Reactions    Amoxicillin     Aspirin      Told not to take b/c of asthma    Penicillins Rash       Past Medical History:   Diagnosis Date    Acute headache     Anemia     Anxiety     Asthma     GERD (gastroesophageal reflux disease)     Hyperlipidemia     Hypertension     Thyroid disorder     Venous insufficiency     Vitamin D deficiency        No past surgical history on file.     Family History   Problem Relation Age of Onset    Heart Disease Paternal Grandfather     Bleeding Prob Brother         MTHFR       Social History Socioeconomic History    Marital status:      Spouse name: Not on file    Number of children: Not on file    Years of education: Not on file    Highest education level: Not on file   Occupational History    Not on file   Social Needs    Financial resource strain: Not on file    Food insecurity     Worry: Not on file     Inability: Not on file    Transportation needs     Medical: Not on file     Non-medical: Not on file   Tobacco Use    Smoking status: Never Smoker    Smokeless tobacco: Never Used   Substance and Sexual Activity    Alcohol use: Yes     Frequency: Monthly or less    Drug use: Never    Sexual activity: Yes     Partners: Male     Comment:  had vasectomy   Lifestyle    Physical activity     Days per week: Not on file     Minutes per session: Not on file    Stress: Not on file   Relationships    Social connections     Talks on phone: Not on file     Gets together: Not on file     Attends [employer]ism service: Not on file     Active member of club or organization: Not on file     Attends meetings of clubs or organizations: Not on file     Relationship status: Not on file    Intimate partner violence     Fear of current or ex partner: Not on file     Emotionally abused: Not on file     Physically abused: Not on file     Forced sexual activity: Not on file   Other Topics Concern    Not on file   Social History Narrative    Not on file       Vitals:    09/18/20 1135   BP: 128/86   Site: Left Upper Arm   Position: Sitting   Cuff Size: Medium Adult   Pulse: 74   Resp: 18   Temp: 98.2 °F (36.8 °C)   TempSrc: Temporal   SpO2: 97%   Weight: 164 lb (74.4 kg)   Height: 5' 11\" (1.803 m)       Exam:  Physical Exam  Constitutional:       Appearance: She is well-developed. Comments: Appears overweight   HENT:      Head: Normocephalic and atraumatic.       Right Ear: External ear normal.      Left Ear: External ear normal.   Eyes:      Conjunctiva/sclera: Conjunctivae normal. Pupils: Pupils are equal, round, and reactive to light. Neck:      Musculoskeletal: Normal range of motion and neck supple. Cardiovascular:      Rate and Rhythm: Normal rate and regular rhythm. Heart sounds: Normal heart sounds. Pulmonary:      Effort: Pulmonary effort is normal.   Abdominal:      General: Bowel sounds are normal.      Palpations: Abdomen is soft. Skin:     General: Skin is warm and dry. Findings: No rash. Neurological:      General: No focal deficit present. Mental Status: She is alert and oriented to person, place, and time. Cranial Nerves: No cranial nerve deficit. Assessment and Plan:  [facility] was seen today for follow-up from hospital.    Diagnoses and all orders for this visit:    Essential hypertension    Nonintractable headache, unspecified chronicity pattern, unspecified headache type    Hypothyroidism, unspecified type        We discussed options. At this point in time, the patient states these episodes are improving. I have asked her to continue to monitor these over the next several weeks and she may use anti-inflammatories on an as-needed basis for abortive therapy. If these do not improve in strength or frequency, I would recommend further evaluation as a CT scan of the head. Possible neurology consultation. Er warnings reviewed. No follow-ups on file. No orders of the defined types were placed in this encounter.       RACHID Ferrari CNP  9/18/2020  12:28 PM

## 2020-09-23 ENCOUNTER — OFFICE VISIT (OUTPATIENT)
Dept: ENDOCRINOLOGY | Age: 40
End: 2020-09-23
Payer: COMMERCIAL

## 2020-09-23 VITALS
RESPIRATION RATE: 16 BRPM | DIASTOLIC BLOOD PRESSURE: 70 MMHG | TEMPERATURE: 98.4 F | OXYGEN SATURATION: 99 % | SYSTOLIC BLOOD PRESSURE: 128 MMHG | BODY MASS INDEX: 22.73 KG/M2 | WEIGHT: 163 LBS | HEART RATE: 88 BPM

## 2020-09-23 PROCEDURE — G8427 DOCREV CUR MEDS BY ELIG CLIN: HCPCS | Performed by: INTERNAL MEDICINE

## 2020-09-23 PROCEDURE — 1036F TOBACCO NON-USER: CPT | Performed by: INTERNAL MEDICINE

## 2020-09-23 PROCEDURE — G8420 CALC BMI NORM PARAMETERS: HCPCS | Performed by: INTERNAL MEDICINE

## 2020-09-23 PROCEDURE — 99205 OFFICE O/P NEW HI 60 MIN: CPT | Performed by: INTERNAL MEDICINE

## 2020-09-23 NOTE — LETTER
[address] [place] [address] Department of Endocrinology Diabetes and Metabolism   [address] [place] [address]   Phone: [phone]  Fax: [phone]      Provider: Dat Fontaine MD  Primary Care Physician: Lissette Ruiz MD   Referring Provider: RACHID Tay *    Patient: Ruba Burroughs  YOB: 1980  Date of Visit: 9/23/2020      Dear Dr. Lissette Ruiz MD   I had the pleasure of seeing your patient Ruba Burroughs today at endocrine clinic for consultation visit and I enclosed a copy of the office visit completed today. Thank you very much for asking us to participate in the care of this very pleasant patient. Please don't hesitate to call if there are any further questions or concerns. Sincerely   Dat Fontaine MD  Endocrinologist, [address] [address]   Phone: [phone]  Fax: [phone]      [address] [place] [address] Department of Endocrinology Diabetes and Metabolism   [address] [place] [address]   Phone: [phone]  Fax: [phone]    Date of Service: 9/23/2020    Primary Care Physician: Lissette Ruiz MD  Referring physician: RACHID Tay *  Provider: Dat Fontaine MD            Reason for the visit:  Diffuse goiter, Hypothyroidism     History of Present Illness: The history is provided by the patient. No  was used. Accuracy of the patient data is excellent. Ruba Burroughs is a very pleasant 36 y.o. female seen today for evaluation and management of multinodular goiter     The patient was found to have enlarged thyroid gland on a routine physical examination   Thyroid US 5/2020, I have reviewed images myself   Right lobe measures 1.7 x 6.6 x 1.8cm and left lobe measures 1.9 x 5.3 x 1.9cm.  Both lobes are diffusely heterogeneous in echotexture.  No dominant cystic or solid mass lesions identified.  Isthmus is unremarkable.   Jennifer Dial denies any voice change, or shortness of breath. No family history of thyroid cancer. No prior history of radiation to head or neck region. She was diagnosed with primary hypothyroidism in 11/2019. Pt currently on levothyroxine 25 mcg daily. Patient takes levothyroxine in the morning at empty stomach, wait one hour before eating , avoid multivitamins containing calcium  or iron with it. Labs 7/2020 showed normal TFT   Lab Results   Component Value Date/Time    TSH 1.87 07/27/2020 10:14 AM    T4FREE 0.90 07/27/2020 10:14 AM     PAST MEDICAL HISTORY   Past Medical History:   Diagnosis Date    Acute headache     Anemia     Anxiety     Asthma     GERD (gastroesophageal reflux disease)     Hyperlipidemia     Hypertension     Thyroid disorder     Venous insufficiency     Vitamin D deficiency        PAST SURGICAL HISTORY   No past surgical history on file. SOCIAL HISTORY   Tobacco:   reports that she has never smoked. She has never used smokeless tobacco.  Alcohol:   reports current alcohol use. Drugs:   reports no history of drug use.     FAMILY HISTORY   Family History   Problem Relation Age of Onset    Heart Disease Paternal Grandfather     Bleeding Prob Brother         MTHFR       ALLERGIES AND DRUG REACTIONS   Allergies   Allergen Reactions    Amoxicillin     Aspirin      Told not to take b/c of asthma    Penicillins Rash       CURRENT MEDICATIONS   Current Outpatient Medications   Medication Sig Dispense Refill    omeprazole (PRILOSEC) 40 MG delayed release capsule Take 1 capsule by mouth daily 30 capsule 5    lisinopril (PRINIVIL;ZESTRIL) 10 MG tablet Take 1 tablet by mouth daily 30 tablet 5    levothyroxine (SYNTHROID) 25 MCG tablet Take 1 tablet by mouth daily 30 tablet 5    buPROPion (WELLBUTRIN SR) 200 MG extended release tablet Take 1 tablet by mouth 2 times daily TAKE 1 TABLET PO 1 TWICE DAILY 60 tablet 5  albuterol sulfate  (90 Base) MCG/ACT inhaler Inhale 2 puffs into the lungs every 6 hours as needed for Wheezing 1 Inhaler 5    albuterol (PROVENTIL) (2.5 MG/3ML) 0.083% nebulizer solution Take 3 mLs by nebulization every 6 hours as needed for Wheezing 120 each 0     No current facility-administered medications for this visit. Review of Systems  Constitutional: No fever, no chills, no diaphoresis, no generalized weakness. HEENT: No blurred vision, No sore throat, no ear pain, no hair loss  Neck: denied any neck swelling, difficulty swallowing,   Cadrdiopulomary: No CP, SOB or palpitation, No orthopnea or PND. No cough or wheezing. GI: No N/V/D, no constipation, No abdominal pain, no melena or hematochezia   : Denied any dysuria, hematuria, flank pain, discharge, or incontinence. Skin: denied any rash, ulcer, Hirsute, or hyperpigmentation. MSK: denied any joint deformity, joint pain/swelling, muscle pain, or back pain. Neuro: no numbess, no tingling, no weakness,     OBJECTIVE    /70 (Site: Right Upper Arm, Position: Sitting, Cuff Size: Medium Adult)   Pulse 88   Temp 98.4 °F (36.9 °C) (Temporal)   Resp 16   Wt 163 lb (73.9 kg)   SpO2 99%   BMI 22.73 kg/m²   BP Readings from Last 4 Encounters:   09/23/20 128/70   09/18/20 128/86   08/31/20 130/88   08/13/20 118/76     Wt Readings from Last 6 Encounters:   09/23/20 163 lb (73.9 kg)   09/18/20 164 lb (74.4 kg)   08/31/20 170 lb (77.1 kg)   08/13/20 163 lb (73.9 kg)   07/01/20 170 lb (77.1 kg)   05/11/20 181 lb (82.1 kg)       Physical examination:  General: awake alert, oriented x3, no abnormal position or movements. HEENT: normocephalic non traumatic  Neck: supple, no LN enlargement, + thyromegaly, I couldn't palpate any thyroid nodules, no thyroid tenderness, no JVD. Pulm: Clear equal air entry no added sounds, no wheezing or rhonchi    CVS: S1 + S2, no murmur, no heave.  Dorsalis pedis pulse palpable · Advised to take Levothyroxine in the morning at empty stomach, wait one hour before eating , avoid multivitamins containing calcium  or iron with it. · TFT with thyroid Abs  before next visit in 1/2021     Return in about 4 months (around 1/23/2021) for Hashimoto's , hypothyroidism, goiter . The above issues were reviewed with the patient who understood and agreed with the plan. Thank you for allowing us to participate in the care of this patient. Please do not hesitate to contact us with any additional questions. Diagnosis Orders   1. Thyroiditis  US THYROID   2. Hypothyroidism, unspecified type  TSH without Reflex    T4, Free    Thyroid AB   3. Diffuse goiter  TSH without Reflex    T4, Free    US THYROID    Thyroid AB     Da White MD  Endocrinologist, [facility])   [address] [place] [address], [address] [place] [address],[facility] [address]   Phone: [phone]  Fax: [phone]  ------------------------------  An electronic signature was used to authenticate this note.  Elza De Jesus MD on 9/23/2020 at 2:00 PM

## 2020-09-23 NOTE — PROGRESS NOTES
[address] [place] [address] Department of Endocrinology Diabetes and Metabolism   [address] [facility] [address]   Phone: [phone]  Fax: [phone]    Date of Service: 9/23/2020    Primary Care Physician: Anayeli De La O MD  Referring physician: Chad Severe, APRN *  Provider: Christ Scott MD            Reason for the visit:  Diffuse goiter, Hypothyroidism     History of Present Illness: The history is provided by the patient. No  was used. Accuracy of the patient data is excellent. Marcos Moody is a very pleasant 36 y.o. female seen today for evaluation and management of multinodular goiter     The patient was found to have enlarged thyroid gland on a routine physical examination   Thyroid US 5/2020, I have reviewed images myself   Right lobe measures 1.7 x 6.6 x 1.8cm and left lobe measures 1.9 x 5.3 x 1.9cm. Both lobes are diffusely heterogeneous in echotexture.  No dominant cystic or solid mass lesions identified.  Isthmus is unremarkable.       Marcos Moody denies any voice change, or shortness of breath. No family history of thyroid cancer. No prior history of radiation to head or neck region. She was diagnosed with primary hypothyroidism in 11/2019. Pt currently on levothyroxine 25 mcg daily. Patient takes levothyroxine in the morning at empty stomach, wait one hour before eating , avoid multivitamins containing calcium  or iron with it. Labs 7/2020 showed normal TFT   Lab Results   Component Value Date/Time    TSH 1.87 07/27/2020 10:14 AM    T4FREE 0.90 07/27/2020 10:14 AM     PAST MEDICAL HISTORY   Past Medical History:   Diagnosis Date    Acute headache     Anemia     Anxiety     Asthma     GERD (gastroesophageal reflux disease)     Hyperlipidemia     Hypertension     Thyroid disorder     Venous insufficiency     Vitamin D deficiency        PAST SURGICAL HISTORY   No past surgical history on file.     SOCIAL HISTORY   Tobacco:   reports that she has never smoked. She has never used smokeless tobacco.  Alcohol:   reports current alcohol use. Drugs:   reports no history of drug use. FAMILY HISTORY   Family History   Problem Relation Age of Onset    Heart Disease Paternal Grandfather     Bleeding Prob Brother         MTHFR       ALLERGIES AND DRUG REACTIONS   Allergies   Allergen Reactions    Amoxicillin     Aspirin      Told not to take b/c of asthma    Penicillins Rash       CURRENT MEDICATIONS   Current Outpatient Medications   Medication Sig Dispense Refill    omeprazole (PRILOSEC) 40 MG delayed release capsule Take 1 capsule by mouth daily 30 capsule 5    lisinopril (PRINIVIL;ZESTRIL) 10 MG tablet Take 1 tablet by mouth daily 30 tablet 5    levothyroxine (SYNTHROID) 25 MCG tablet Take 1 tablet by mouth daily 30 tablet 5    buPROPion (WELLBUTRIN SR) 200 MG extended release tablet Take 1 tablet by mouth 2 times daily TAKE 1 TABLET PO 1 TWICE DAILY 60 tablet 5    albuterol sulfate  (90 Base) MCG/ACT inhaler Inhale 2 puffs into the lungs every 6 hours as needed for Wheezing 1 Inhaler 5    albuterol (PROVENTIL) (2.5 MG/3ML) 0.083% nebulizer solution Take 3 mLs by nebulization every 6 hours as needed for Wheezing 120 each 0     No current facility-administered medications for this visit. Review of Systems  Constitutional: No fever, no chills, no diaphoresis, no generalized weakness. HEENT: No blurred vision, No sore throat, no ear pain, no hair loss  Neck: denied any neck swelling, difficulty swallowing,   Cadrdiopulomary: No CP, SOB or palpitation, No orthopnea or PND. No cough or wheezing. GI: No N/V/D, no constipation, No abdominal pain, no melena or hematochezia   : Denied any dysuria, hematuria, flank pain, discharge, or incontinence. Skin: denied any rash, ulcer, Hirsute, or hyperpigmentation. MSK: denied any joint deformity, joint pain/swelling, muscle pain, or back pain.   Neuro: no numbess, no tingling, no weakness,     OBJECTIVE    /70 (Site: Right Upper Arm, Position: Sitting, Cuff Size: Medium Adult)   Pulse 88   Temp 98.4 °F (36.9 °C) (Temporal)   Resp 16   Wt 163 lb (73.9 kg)   SpO2 99%   BMI 22.73 kg/m²   BP Readings from Last 4 Encounters:   09/23/20 128/70   09/18/20 128/86   08/31/20 130/88   08/13/20 118/76     Wt Readings from Last 6 Encounters:   09/23/20 163 lb (73.9 kg)   09/18/20 164 lb (74.4 kg)   08/31/20 170 lb (77.1 kg)   08/13/20 163 lb (73.9 kg)   07/01/20 170 lb (77.1 kg)   05/11/20 181 lb (82.1 kg)       Physical examination:  General: awake alert, oriented x3, no abnormal position or movements. HEENT: normocephalic non traumatic  Neck: supple, no LN enlargement, + thyromegaly, I couldn't palpate any thyroid nodules, no thyroid tenderness, no JVD. Pulm: Clear equal air entry no added sounds, no wheezing or rhonchi    CVS: S1 + S2, no murmur, no heave. Dorsalis pedis pulse palpable   Abd: soft lax, no tenderness, no organomegaly, audible bowel sounds. Skin: warm, no lesions, no rash.  No Palmar erythema  Musculoskeletal: No back tenderness, no kyphosis/scoliosis     Neuro: CN intact, sensation normal , muscle power normal. No tremors   Psych: normal mood, and affect    Review of Laboratory Data:  I personally reviewed the following labs:   Lab Results   Component Value Date/Time    WBC 5.2 08/31/2020 11:48 AM    RBC 3.92 (L) 08/31/2020 11:48 AM    HGB 11.3 (L) 08/31/2020 11:48 AM    HCT 34.6 (L) 08/31/2020 11:48 AM    MCV 88.2 08/31/2020 11:48 AM    MCH 28.9 08/31/2020 11:48 AM    MCHC 32.7 (L) 08/31/2020 11:48 AM    RDW 13.6 08/31/2020 11:48 AM     08/31/2020 11:48 AM    MPV 9.8 08/31/2020 11:48 AM      Lab Results   Component Value Date/Time     08/31/2020 11:48 AM    K 4.2 08/31/2020 11:48 AM    CO2 25 08/31/2020 11:48 AM    BUN 13 08/31/2020 11:48 AM    CREATININE 0.6 08/31/2020 11:48 AM    CREATININE 0.6 11/08/2018    CALCIUM 8.8 08/31/2020 11:48 AM    LABGLOM 111 08/31/2020 11:48 AM      Lab Results   Component Value Date/Time    TSH 1.87 07/27/2020 10:14 AM    T4FREE 0.90 07/27/2020 10:14 AM     Lab Results   Component Value Date    GLUCOSE 97 08/31/2020     Lab Results   Component Value Date    TRIG 39 07/27/2020    HDL 70 07/27/2020    CHOL 193 07/27/2020     No results found for: [facility] [address] Records/Labs/Images Review:   I personally reviewed and summarized previous records   All labs and imaging were reviewed independently    [address] [place], a 36 y.o.-old female seen in for the following issues     Diffuse Goiter   · Likely due to hashimoto's thyroiditis  · I have reviewed images of thyroid US that was done in 5/2020 and there is no discrete thyroid nodules   · Check thyroid US 1/2021   · Will follow next US    Hypothyroidism   · Doing well on levothyroxine 25 mcg daily  · Advised to take Levothyroxine in the morning at empty stomach, wait one hour before eating , avoid multivitamins containing calcium  or iron with it. · TFT with thyroid Abs  before next visit in 1/2021     Return in about 4 months (around 1/23/2021) for Hashimoto's , hypothyroidism, goiter . The above issues were reviewed with the patient who understood and agreed with the plan. Thank you for allowing us to participate in the care of this patient. Please do not hesitate to contact us with any additional questions. Diagnosis Orders   1. Thyroiditis  US THYROID   2. Hypothyroidism, unspecified type  TSH without Reflex    T4, Free    Thyroid AB   3. Diffuse goiter  TSH without Reflex    T4, Free    US THYROID    Thyroid AB     Clementine Marin MD  Endocrinologist, [facility])   [address] [facility], [address] [place] [address],[address] [address]   Phone: [phone]  Fax: [phone]  ------------------------------  An electronic signature was used to authenticate this note.  Sachin Recinos MD on 9/23/2020 at 2:00 PM

## 2020-09-26 PROBLEM — E04.0 DIFFUSE GOITER: Status: ACTIVE | Noted: 2020-09-26

## 2020-11-23 ENCOUNTER — OFFICE VISIT (OUTPATIENT)
Dept: PRIMARY CARE CLINIC | Age: 40
End: 2020-11-23
Payer: COMMERCIAL

## 2020-11-23 VITALS
BODY MASS INDEX: 23.38 KG/M2 | HEIGHT: 71 IN | TEMPERATURE: 98.4 F | WEIGHT: 167 LBS | DIASTOLIC BLOOD PRESSURE: 78 MMHG | HEART RATE: 77 BPM | SYSTOLIC BLOOD PRESSURE: 122 MMHG | OXYGEN SATURATION: 98 %

## 2020-11-23 DIAGNOSIS — Z20.822 SUSPECTED COVID-19 VIRUS INFECTION: ICD-10-CM

## 2020-11-23 PROCEDURE — 99202 OFFICE O/P NEW SF 15 MIN: CPT | Performed by: CLINICAL NURSE SPECIALIST

## 2020-11-23 RX ORDER — AZITHROMYCIN 250 MG/1
250 TABLET, FILM COATED ORAL SEE ADMIN INSTRUCTIONS
Qty: 6 TABLET | Refills: 0 | Status: SHIPPED | OUTPATIENT
Start: 2020-11-23 | End: 2020-11-28

## 2020-11-23 RX ORDER — METHYLPREDNISOLONE 4 MG/1
TABLET ORAL
Qty: 21 TABLET | Refills: 0 | Status: SHIPPED | OUTPATIENT
Start: 2020-11-23 | End: 2020-11-29

## 2020-11-23 ASSESSMENT — ENCOUNTER SYMPTOMS
COUGH: 1
CHEST TIGHTNESS: 1
NAUSEA: 1
ALLERGIC/IMMUNOLOGIC NEGATIVE: 1
EYE REDNESS: 1
WHEEZING: 1

## 2020-11-23 NOTE — PROGRESS NOTES
Subjective:      Patient ID: Lee Kinney is a 36 y.o. female. Patient presented to the [facility] with a chief complaint of congestion for the past 3 days. Patient stated that she has a past medical history of asthma. Patient complains of thick clear drainage. Patient has been febrile of 101 and experiencing headache, body aches and swollen glands. Patient stated that the shortness of breath is worse than normal and she is wheezing more. Patient stated that she's been having tightness and burning sensation in her chest.                Review of Systems   Constitutional: Positive for fever. HENT: Positive for congestion. Eyes: Positive for redness. Respiratory: Positive for cough, chest tightness and wheezing. Cardiovascular: Negative. Gastrointestinal: Positive for nausea. Endocrine: Negative. Genitourinary: Negative. Musculoskeletal: Positive for myalgias. Skin: Negative. Allergic/Immunologic: Negative. Neurological: Positive for headaches. Hematological: Positive for adenopathy. Psychiatric/Behavioral: Negative. Objective:   Physical Exam  Vitals signs and nursing note reviewed. Constitutional:       General: She is not in acute distress. Appearance: Normal appearance. She is normal weight. She is not ill-appearing, toxic-appearing or diaphoretic. HENT:      Head: Normocephalic. Right Ear: Tympanic membrane and external ear normal. There is no impacted cerumen. Left Ear: Tympanic membrane and external ear normal. There is no impacted cerumen. Ears:      Comments: Erythema to bilateral ear canals     Nose: Nose normal. No congestion or rhinorrhea. Mouth/Throat:      Mouth: Mucous membranes are moist.      Pharynx: Oropharynx is clear. Posterior oropharyngeal erythema present. No oropharyngeal exudate. Eyes:      General: No scleral icterus. Right eye: No discharge. Left eye: No discharge.       Extraocular Movements: Extraocular movements intact. Conjunctiva/sclera: Conjunctivae normal.      Pupils: Pupils are equal, round, and reactive to light. Comments: Red injection to the sclera of the eyes   Neck:      Musculoskeletal: Normal range of motion and neck supple. No neck rigidity or muscular tenderness. Vascular: No carotid bruit. Cardiovascular:      Rate and Rhythm: Normal rate and regular rhythm. Pulses: Normal pulses. Heart sounds: Normal heart sounds. No murmur. No friction rub. No gallop. Pulmonary:      Effort: Pulmonary effort is normal. No respiratory distress. Breath sounds: No stridor. Wheezing and rhonchi present. No rales. Chest:      Chest wall: No tenderness. Abdominal:      General: Abdomen is flat. Bowel sounds are normal. There is no distension. Palpations: Abdomen is soft. There is no mass. Tenderness: There is no abdominal tenderness. There is no right CVA tenderness, left CVA tenderness, guarding or rebound. Hernia: No hernia is present. Musculoskeletal: Normal range of motion. General: No swelling, tenderness, deformity or signs of injury. Right lower leg: No edema. Left lower leg: No edema. Lymphadenopathy:      Cervical: Cervical adenopathy present. Skin:     General: Skin is warm and dry. Capillary Refill: Capillary refill takes less than 2 seconds. Coloration: Skin is not jaundiced or pale. Findings: No bruising, erythema, lesion or rash. Neurological:      General: No focal deficit present. Mental Status: She is alert and oriented to person, place, and time. Cranial Nerves: No cranial nerve deficit. Sensory: No sensory deficit. Motor: No weakness. Coordination: Coordination normal.      Gait: Gait normal.      Deep Tendon Reflexes: Reflexes normal.   Psychiatric:         Mood and Affect: Mood normal.         Behavior: Behavior normal.         Thought Content:  Thought

## 2020-11-24 ENCOUNTER — TELEPHONE (OUTPATIENT)
Dept: PRIMARY CARE CLINIC | Age: 40
End: 2020-11-24

## 2020-11-24 LAB
SARS-COV-2: DETECTED
SOURCE: ABNORMAL

## 2020-11-24 NOTE — TELEPHONE ENCOUNTER
Please let her know I received the lab work from the [facility], her white blood cell count was just slightly low and her cholesterol is likely higher than last year but overall this looks okay. I would recommend any changes at this time, I do see that she was at the flu clinic yesterday she starts to feel better soon.   Covid results are not yet back

## 2021-01-14 LAB
T4 FREE: 1.03
TSH SERPL DL<=0.05 MIU/L-ACNC: 2.47 UIU/ML

## 2021-01-19 DIAGNOSIS — E04.0 DIFFUSE GOITER: ICD-10-CM

## 2021-01-19 DIAGNOSIS — E06.9 THYROIDITIS: ICD-10-CM

## 2021-01-19 DIAGNOSIS — E03.9 HYPOTHYROIDISM, UNSPECIFIED TYPE: ICD-10-CM

## 2021-01-26 ENCOUNTER — OFFICE VISIT (OUTPATIENT)
Dept: ENDOCRINOLOGY | Age: 41
End: 2021-01-26
Payer: COMMERCIAL

## 2021-01-26 VITALS
HEART RATE: 68 BPM | TEMPERATURE: 98.2 F | WEIGHT: 170.8 LBS | OXYGEN SATURATION: 98 % | DIASTOLIC BLOOD PRESSURE: 70 MMHG | SYSTOLIC BLOOD PRESSURE: 122 MMHG | RESPIRATION RATE: 16 BRPM | BODY MASS INDEX: 23.82 KG/M2

## 2021-01-26 DIAGNOSIS — E06.9 THYROIDITIS: Primary | ICD-10-CM

## 2021-01-26 DIAGNOSIS — E03.9 HYPOTHYROIDISM, UNSPECIFIED TYPE: ICD-10-CM

## 2021-01-26 DIAGNOSIS — E04.0 DIFFUSE GOITER: ICD-10-CM

## 2021-01-26 PROCEDURE — G8420 CALC BMI NORM PARAMETERS: HCPCS | Performed by: INTERNAL MEDICINE

## 2021-01-26 PROCEDURE — 99214 OFFICE O/P EST MOD 30 MIN: CPT | Performed by: INTERNAL MEDICINE

## 2021-01-26 PROCEDURE — 1036F TOBACCO NON-USER: CPT | Performed by: INTERNAL MEDICINE

## 2021-01-26 PROCEDURE — G8484 FLU IMMUNIZE NO ADMIN: HCPCS | Performed by: INTERNAL MEDICINE

## 2021-01-26 PROCEDURE — G8427 DOCREV CUR MEDS BY ELIG CLIN: HCPCS | Performed by: INTERNAL MEDICINE

## 2021-01-26 NOTE — PROGRESS NOTES
[address] [place] [address] Department of Endocrinology Diabetes and Metabolism   [address] [facility] [address]   Phone: [phone]  Fax: [phone]    Date of Service: 1/26/2021    Primary Care Physician: Cam Santos MD  Referring physician: No ref. provider found  Provider: Maru Pace MD            Reason for the visit:  Diffuse goiter, Hypothyroidism     History of Present Illness: The history is provided by the patient. No  was used. Accuracy of the patient data is excellent. Tho Fall is a very pleasant 36 y.o. female seen today for evaluation and management of multinodular goiter     The patient was found to have enlarged thyroid gland on a routine physical examination   Thyroid US 5/2020  Right lobe measures 1.7 x 6.6 x 1.8cm and left lobe measures 1.9 x 5.3 x 1.9cm. Both lobes are diffusely heterogeneous in echotexture.  No dominant cystic or solid mass lesions identified.  Isthmus is unremarkable.       Tho Fall denies any voice change, or shortness of breath. No family history of thyroid cancer. No prior history of radiation to head or neck region. She was diagnosed with primary hypothyroidism in 11/2019. Pt currently on levothyroxine 25 mcg daily. Patient takes levothyroxine in the morning at empty stomach, wait one hour before eating , avoid multivitamins containing calcium  or iron with it. Labs 7/2020 showed normal TFT   Lab Results   Component Value Date/Time    TSH 2.47 01/14/2021    T4FREE 1.03 01/14/2021     PAST MEDICAL HISTORY   Past Medical History:   Diagnosis Date    Acute headache     Anemia     Anxiety     Asthma     GERD (gastroesophageal reflux disease)     Hyperlipidemia     Hypertension     Thyroid disorder     Venous insufficiency     Vitamin D deficiency        PAST SURGICAL HISTORY   No past surgical history on file. SOCIAL HISTORY   Tobacco:   reports that she has never smoked.  She has never used smokeless tobacco.  Alcohol:   reports current alcohol use. Drugs:   reports no history of drug use. FAMILY HISTORY   Family History   Problem Relation Age of Onset    Heart Disease Paternal Grandfather     Bleeding Prob Brother         MTHFR       ALLERGIES AND DRUG REACTIONS   Allergies   Allergen Reactions    Amoxicillin     Aspirin      Told not to take b/c of asthma    Penicillins Rash       CURRENT MEDICATIONS   Current Outpatient Medications   Medication Sig Dispense Refill    omeprazole (PRILOSEC) 40 MG delayed release capsule Take 1 capsule by mouth daily 30 capsule 5    lisinopril (PRINIVIL;ZESTRIL) 10 MG tablet Take 1 tablet by mouth daily 30 tablet 5    levothyroxine (SYNTHROID) 25 MCG tablet Take 1 tablet by mouth daily 30 tablet 5    buPROPion (WELLBUTRIN SR) 200 MG extended release tablet Take 1 tablet by mouth 2 times daily TAKE 1 TABLET PO 1 TWICE DAILY 60 tablet 5    albuterol sulfate  (90 Base) MCG/ACT inhaler Inhale 2 puffs into the lungs every 6 hours as needed for Wheezing 1 Inhaler 5    albuterol (PROVENTIL) (2.5 MG/3ML) 0.083% nebulizer solution Take 3 mLs by nebulization every 6 hours as needed for Wheezing 120 each 0     No current facility-administered medications for this visit. Review of Systems  Constitutional: No fever, no chills, no diaphoresis, no generalized weakness. HEENT: No blurred vision, No sore throat, no ear pain, no hair loss  Neck: denied any neck swelling, difficulty swallowing,   Cadrdiopulomary: No CP, SOB or palpitation, No orthopnea or PND. No cough or wheezing. GI: No N/V/D, no constipation, No abdominal pain, no melena or hematochezia   : Denied any dysuria, hematuria, flank pain, discharge, or incontinence. Skin: denied any rash, ulcer, Hirsute, or hyperpigmentation. MSK: denied any joint deformity, joint pain/swelling, muscle pain, or back pain.   Neuro: no numbess, no tingling, no weakness,     OBJECTIVE    /70 (Site: Right Upper Arm, Position: Sitting, Cuff Size: Medium Adult)   Pulse 68   Temp 98.2 °F (36.8 °C) (Temporal)   Resp 16   Wt 170 lb 12.8 oz (77.5 kg)   SpO2 98%   BMI 23.82 kg/m²   BP Readings from Last 4 Encounters:   01/26/21 122/70   11/23/20 122/78   09/23/20 128/70   09/18/20 128/86     Wt Readings from Last 6 Encounters:   01/26/21 170 lb 12.8 oz (77.5 kg)   11/23/20 167 lb (75.8 kg)   09/23/20 163 lb (73.9 kg)   09/18/20 164 lb (74.4 kg)   08/31/20 170 lb (77.1 kg)   08/13/20 163 lb (73.9 kg)       Physical examination:  General: awake alert, oriented x3, no abnormal position or movements. HEENT: normocephalic non traumatic  Neck: supple, no LN enlargement, + thyromegaly, I couldn't palpate any thyroid nodules, no thyroid tenderness, no JVD. Pulm: Clear equal air entry no added sounds, no wheezing or rhonchi    CVS: S1 + S2, no murmur, no heave. Dorsalis pedis pulse palpable   Abd: soft lax, no tenderness, no organomegaly, audible bowel sounds. Skin: warm, no lesions, no rash.  No Palmar erythema  Musculoskeletal: No back tenderness, no kyphosis/scoliosis     Neuro: CN intact, sensation normal , muscle power normal. No tremors   Psych: normal mood, and affect    Review of Laboratory Data:  I personally reviewed the following labs:   Lab Results   Component Value Date/Time    WBC 5.2 08/31/2020 11:48 AM    RBC 3.92 (L) 08/31/2020 11:48 AM    HGB 11.3 (L) 08/31/2020 11:48 AM    HCT 34.6 (L) 08/31/2020 11:48 AM    MCV 88.2 08/31/2020 11:48 AM    MCH 28.9 08/31/2020 11:48 AM    MCHC 32.7 (L) 08/31/2020 11:48 AM    RDW 13.6 08/31/2020 11:48 AM     08/31/2020 11:48 AM    MPV 9.8 08/31/2020 11:48 AM      Lab Results   Component Value Date/Time     08/31/2020 11:48 AM    K 4.2 08/31/2020 11:48 AM    CO2 25 08/31/2020 11:48 AM    BUN 13 08/31/2020 11:48 AM    CREATININE 0.6 08/31/2020 11:48 AM    CREATININE 0.6 11/08/2018    CALCIUM 8.8 08/31/2020 11:48 AM    LABGLOM 111 08/31/2020 11:48 AM Lab Results   Component Value Date/Time    TSH 2.47 01/14/2021    T4FREE 1.03 01/14/2021     Lab Results   Component Value Date    GLUCOSE 97 08/31/2020     Lab Results   Component Value Date    TRIG 39 07/27/2020    HDL 70 07/27/2020    CHOL 193 07/27/2020     No results found for: Vishal 30   [place] Joshkamlairina [address] y.o.-old female seen in for the following issues     Diffuse Goiter   · Likely due to hashimoto's thyroiditis  · I have reviewed images of thyroid US and there is no discrete thyroid nodules   · Will follow with intermittent US     Hypothyroidism   · Doing well on levothyroxine 25 mcg daily  · Advised to take Levothyroxine in the morning at empty stomach, wait one hour before eating , avoid multivitamins containing calcium  or iron with it. · TFT before next visit     I personally reviewed external notes from PCP and other patient's care team providers, and personally interpreted labs associated with the above diagnosis. I also ordered labs to further assess and manage the above addressed medical conditions    Return in about 6 months (around 7/26/2021) for Hypothyroidism . The above issues were reviewed with the patient who understood and agreed with the plan. Thank you for allowing us to participate in the care of this patient. Please do not hesitate to contact us with any additional questions. Diagnosis Orders   1. Thyroiditis     2. Diffuse goiter     3. Hypothyroidism, unspecified type  TSH without Reflex    T4, Free     Kecia Pollock MD  Endocrinologist, [facility])   [address] [place] [address], [address] [place] [address],[address] [id number]   Phone: [phone]  Fax: [phone]  ------------------------------  An electronic signature was used to authenticate this note.  Lilli Schultz MD on 1/26/2021 at 2:48 PM

## 2021-05-05 DIAGNOSIS — Z01.419 ENCOUNTER FOR ANNUAL ROUTINE GYNECOLOGICAL EXAMINATION: ICD-10-CM

## 2021-05-10 LAB
HPV SAMPLE: NORMAL
HPV TYPE 16: NOT DETECTED
HPV TYPE 18: NOT DETECTED
HPV, HIGH RISK OTHER: NOT DETECTED
INTERPRETATION: NORMAL
SOURCE: NORMAL

## 2021-07-21 LAB
T4 FREE: 1.16 NG/DL (ref 0.61–1.12)
TSH SERPL DL<=0.05 MIU/L-ACNC: 2.25 UIU/ML (ref 0.34–5.6)

## 2021-07-26 ENCOUNTER — OFFICE VISIT (OUTPATIENT)
Dept: ENDOCRINOLOGY | Age: 41
End: 2021-07-26
Payer: COMMERCIAL

## 2021-07-26 VITALS
RESPIRATION RATE: 18 BRPM | BODY MASS INDEX: 23.66 KG/M2 | DIASTOLIC BLOOD PRESSURE: 72 MMHG | WEIGHT: 169 LBS | SYSTOLIC BLOOD PRESSURE: 118 MMHG | OXYGEN SATURATION: 98 % | HEART RATE: 76 BPM | HEIGHT: 71 IN

## 2021-07-26 DIAGNOSIS — E03.8 OTHER SPECIFIED HYPOTHYROIDISM: ICD-10-CM

## 2021-07-26 DIAGNOSIS — E03.9 HYPOTHYROIDISM, UNSPECIFIED TYPE: ICD-10-CM

## 2021-07-26 DIAGNOSIS — E55.9 VITAMIN D DEFICIENCY: ICD-10-CM

## 2021-07-26 DIAGNOSIS — E04.0 DIFFUSE GOITER: Primary | ICD-10-CM

## 2021-07-26 PROCEDURE — 1036F TOBACCO NON-USER: CPT | Performed by: INTERNAL MEDICINE

## 2021-07-26 PROCEDURE — 99214 OFFICE O/P EST MOD 30 MIN: CPT | Performed by: INTERNAL MEDICINE

## 2021-07-26 PROCEDURE — G8420 CALC BMI NORM PARAMETERS: HCPCS | Performed by: INTERNAL MEDICINE

## 2021-07-26 PROCEDURE — G8428 CUR MEDS NOT DOCUMENT: HCPCS | Performed by: INTERNAL MEDICINE

## 2021-07-26 RX ORDER — LEVOTHYROXINE SODIUM 0.03 MG/1
25 TABLET ORAL DAILY
Qty: 90 TABLET | Refills: 3 | Status: SHIPPED | OUTPATIENT
Start: 2021-07-26

## 2021-07-26 NOTE — PROGRESS NOTES
[address] [place] [address] Department of Endocrinology Diabetes and Metabolism   [address] [facility] [address]   Phone: [phone]  Fax: [phone]    Date of Service: 7/26/2021  Primary Care Physician: Ernesto Herrera MD    Provider: Elizabeth Justin MD            Reason for the visit:  Diffuse goiter, Hypothyroidism     History of Present Illness: The history is provided by the patient. No  was used. Accuracy of the patient data is excellent. Matthew Siddiqui is a very pleasant 39 y.o. female seen today for follow up visit     The patient was found to have enlarged thyroid gland on a routine physical examination   Thyroid US 5/2020  Right lobe measures 1.7 x 6.6 x 1.8cm and left lobe measures 1.9 x 5.3 x 1.9cm. Both lobes are diffusely heterogeneous in echotexture.  No dominant cystic or solid mass lesions identified.  Isthmus is unremarkable.       Matthew Siddiqui denies any voice change, or shortness of breath. No family history of thyroid cancer. No prior history of radiation to head or neck region. She was diagnosed with primary hypothyroidism in 11/2019. Pt currently on levothyroxine 25 mcg daily. Patient takes levothyroxine in the morning at empty stomach, wait one hour before eating , avoid multivitamins containing calcium  or iron with it. Lab Results   Component Value Date/Time    TSH 2.25 07/21/2021 07:37 AM    T4FREE 1.16 (H) 07/21/2021 07:37 AM     PAST MEDICAL HISTORY   Past Medical History:   Diagnosis Date    Acute headache     Anemia     Anxiety     Asthma     GERD (gastroesophageal reflux disease)     Hyperlipidemia     Hypertension     Thyroid disorder     Venous insufficiency     Vitamin D deficiency        PAST SURGICAL HISTORY   No past surgical history on file. SOCIAL HISTORY   Tobacco:   reports that she has never smoked. She has never used smokeless tobacco.  Alcohol:   reports current alcohol use.   Drugs:   reports no history of drug use. FAMILY HISTORY   Family History   Problem Relation Age of Onset    Heart Disease Paternal Grandfather     Bleeding Prob Brother         MTHFR       ALLERGIES AND DRUG REACTIONS   Allergies   Allergen Reactions    Amoxicillin     Aspirin      Told not to take b/c of asthma    Penicillins Rash       CURRENT MEDICATIONS   Current Outpatient Medications   Medication Sig Dispense Refill    omeprazole (PRILOSEC) 40 MG delayed release capsule Take 1 capsule by mouth daily 30 capsule 5    lisinopril (PRINIVIL;ZESTRIL) 10 MG tablet Take 1 tablet by mouth daily 30 tablet 5    levothyroxine (SYNTHROID) 25 MCG tablet Take 1 tablet by mouth daily 30 tablet 5    buPROPion (WELLBUTRIN SR) 200 MG extended release tablet Take 1 tablet by mouth 2 times daily TAKE 1 TABLET PO 1 TWICE DAILY 60 tablet 5    albuterol sulfate  (90 Base) MCG/ACT inhaler Inhale 2 puffs into the lungs every 6 hours as needed for Wheezing 1 Inhaler 5    albuterol (PROVENTIL) (2.5 MG/3ML) 0.083% nebulizer solution Take 3 mLs by nebulization every 6 hours as needed for Wheezing 120 each 0     No current facility-administered medications for this visit. Review of Systems  Constitutional: No fever, no chills, no diaphoresis, no generalized weakness. HEENT: No blurred vision, No sore throat, no ear pain, no hair loss  Neck: denied any neck swelling, difficulty swallowing,   Cadrdiopulomary: No CP, SOB or palpitation, No orthopnea or PND. No cough or wheezing. GI: No N/V/D, no constipation, No abdominal pain, no melena or hematochezia   : Denied any dysuria, hematuria, flank pain, discharge, or incontinence. Skin: denied any rash, ulcer, Hirsute, or hyperpigmentation. MSK: denied any joint deformity, joint pain/swelling, muscle pain, or back pain.   Neuro: no numbess, no tingling, no weakness,     OBJECTIVE    /72   Pulse 76   Resp 18   Ht 5' 11\" (1.803 m)   Wt 169 lb (76.7 kg)   SpO2 98%   BMI 23.57 kg/m² Lab Results   Component Value Date    TRIG 43 06/01/2021    HDL 81 06/01/2021    CHOL 204 06/01/2021     No results found for: Vishal Dupree, a 39 y.o.-old female seen in for the following issues     Diffuse Goiter   · Likely due to hashimoto's thyroiditis  · I have reviewed images of thyroid US and there is no discrete thyroid nodules     Hypothyroidism   · Doing well on levothyroxine 25 mcg daily  · TFT in 6 months     I personally reviewed external notes from PCP and other patient's care team providers, and personally interpreted labs associated with the above diagnosis. I also ordered labs to further assess and manage the above addressed medical conditions    Return in about 1 year (around 7/26/2022) for hypothyroidism, VitD deficiency. The above issues were reviewed with the patient who understood and agreed with the plan. Thank you for allowing us to participate in the care of this patient. Please do not hesitate to contact us with any additional questions. Diagnosis Orders   1. Diffuse goiter     2. Hypothyroidism, unspecified type  TSH without Reflex    T4, Free   3. Vitamin D deficiency  Vitamin D 25 Hydroxy   4. Other specified hypothyroidism  levothyroxine (SYNTHROID) 25 MCG tablet     Julissa Baker MD  Endocrinologist, [facility])   [address] [facility], [address] [place] [address],[address] [address]   Phone: [phone]  Fax: [phone]  ------------------------------  An electronic signature was used to authenticate this note.  Dennie Star, MD on 7/26/2021 at 3:42 PM

## 2022-07-14 LAB
25-HYDROXY VITAMIN D-3: 28.5 NG/ML (ref 30–100)
T4 FREE: 0.97
T4 FREE: 0.97 NG/DL (ref 0.61–1.12)
TSH SERPL DL<=0.05 MIU/L-ACNC: 3.72 UIU/ML
TSH SERPL DL<=0.05 MIU/L-ACNC: 3.72 UIU/ML (ref 0.34–5.6)

## 2022-07-22 DIAGNOSIS — E03.9 HYPOTHYROIDISM, UNSPECIFIED TYPE: ICD-10-CM

## 2022-07-22 DIAGNOSIS — E55.9 VITAMIN D DEFICIENCY: ICD-10-CM

## 2022-07-27 ENCOUNTER — TELEPHONE (OUTPATIENT)
Dept: ENDOCRINOLOGY | Age: 42
End: 2022-07-27

## 2023-08-14 ENCOUNTER — TELEPHONE (OUTPATIENT)
Dept: ENDOCRINOLOGY | Age: 43
End: 2023-08-14

## 2023-08-23 ENCOUNTER — OFFICE VISIT (OUTPATIENT)
Dept: ENDOCRINOLOGY | Age: 43
End: 2023-08-23
Payer: COMMERCIAL

## 2023-08-23 VITALS
RESPIRATION RATE: 18 BRPM | WEIGHT: 222 LBS | BODY MASS INDEX: 31.08 KG/M2 | OXYGEN SATURATION: 100 % | DIASTOLIC BLOOD PRESSURE: 95 MMHG | HEART RATE: 66 BPM | SYSTOLIC BLOOD PRESSURE: 148 MMHG | HEIGHT: 71 IN

## 2023-08-23 DIAGNOSIS — E04.0 DIFFUSE GOITER: Primary | ICD-10-CM

## 2023-08-23 DIAGNOSIS — E55.9 VITAMIN D DEFICIENCY: ICD-10-CM

## 2023-08-23 DIAGNOSIS — E03.8 OTHER SPECIFIED HYPOTHYROIDISM: ICD-10-CM

## 2023-08-23 DIAGNOSIS — E03.9 HYPOTHYROIDISM, UNSPECIFIED TYPE: ICD-10-CM

## 2023-08-23 PROCEDURE — 3077F SYST BP >= 140 MM HG: CPT | Performed by: CLINICAL NURSE SPECIALIST

## 2023-08-23 PROCEDURE — G8427 DOCREV CUR MEDS BY ELIG CLIN: HCPCS | Performed by: CLINICAL NURSE SPECIALIST

## 2023-08-23 PROCEDURE — 99214 OFFICE O/P EST MOD 30 MIN: CPT | Performed by: CLINICAL NURSE SPECIALIST

## 2023-08-23 PROCEDURE — G8417 CALC BMI ABV UP PARAM F/U: HCPCS | Performed by: CLINICAL NURSE SPECIALIST

## 2023-08-23 PROCEDURE — 1036F TOBACCO NON-USER: CPT | Performed by: CLINICAL NURSE SPECIALIST

## 2023-08-23 PROCEDURE — 3080F DIAST BP >= 90 MM HG: CPT | Performed by: CLINICAL NURSE SPECIALIST

## 2023-08-23 RX ORDER — FOLIC ACID 1 MG/1
1000 TABLET ORAL DAILY
COMMUNITY
Start: 2023-07-07

## 2023-08-23 RX ORDER — LEVOTHYROXINE SODIUM 0.05 MG/1
50 TABLET ORAL DAILY
COMMUNITY
Start: 2023-06-25

## 2023-08-24 LAB
T4 FREE: 1.5 NG/DL (ref 0.9–1.7)
TSH SERPL DL<=0.05 MIU/L-ACNC: 2.75 UIU/ML (ref 0.27–4.2)
VITAMIN D 25-HYDROXY: 23.7 NG/ML (ref 30–100)

## 2023-12-26 ENCOUNTER — OFFICE VISIT (OUTPATIENT)
Dept: FAMILY MEDICINE CLINIC | Age: 43
End: 2023-12-26
Payer: COMMERCIAL

## 2023-12-26 VITALS
SYSTOLIC BLOOD PRESSURE: 136 MMHG | OXYGEN SATURATION: 98 % | HEIGHT: 71 IN | DIASTOLIC BLOOD PRESSURE: 80 MMHG | WEIGHT: 227 LBS | BODY MASS INDEX: 31.78 KG/M2 | HEART RATE: 75 BPM | TEMPERATURE: 98.1 F | RESPIRATION RATE: 16 BRPM

## 2023-12-26 DIAGNOSIS — M79.671 RIGHT FOOT PAIN: Primary | ICD-10-CM

## 2023-12-26 DIAGNOSIS — I83.811 VARICOSE VEINS OF RIGHT LOWER EXTREMITY WITH PAIN: ICD-10-CM

## 2023-12-26 PROCEDURE — 3075F SYST BP GE 130 - 139MM HG: CPT | Performed by: NURSE PRACTITIONER

## 2023-12-26 PROCEDURE — 3079F DIAST BP 80-89 MM HG: CPT | Performed by: NURSE PRACTITIONER

## 2023-12-26 PROCEDURE — G8484 FLU IMMUNIZE NO ADMIN: HCPCS | Performed by: NURSE PRACTITIONER

## 2023-12-26 PROCEDURE — G8417 CALC BMI ABV UP PARAM F/U: HCPCS | Performed by: NURSE PRACTITIONER

## 2023-12-26 PROCEDURE — 1036F TOBACCO NON-USER: CPT | Performed by: NURSE PRACTITIONER

## 2023-12-26 PROCEDURE — 99214 OFFICE O/P EST MOD 30 MIN: CPT | Performed by: NURSE PRACTITIONER

## 2023-12-26 PROCEDURE — G8427 DOCREV CUR MEDS BY ELIG CLIN: HCPCS | Performed by: NURSE PRACTITIONER

## 2023-12-26 RX ORDER — PREDNISONE 10 MG/1
TABLET ORAL
Qty: 30 TABLET | Refills: 0 | Status: SHIPPED | OUTPATIENT
Start: 2023-12-26 | End: 2024-01-06

## 2023-12-26 NOTE — PROGRESS NOTES
Subjective:  Chief Complaint   Patient presents with    Foot Swelling     right       HPI:  Patient reports pain to the right ankle/foot. The patient states the pain has been present for the last 3 days. The patient denies any injury, complains of pain to the dorsal aspect of the right foot, as well as the lateral malleolus of the right ankle, extending laterally up the leg, and to the medial knee. Pain is worse with ambulation. Patient denies numbness, tingling, weakness or paralysis to the extremity. No redness and warmth to the affected area. No fever or chills. She is not on any hormonal birth control, she denies any recent travel or history of blood clot. Patient comes to the urgent care for evaluation. ROS:  Positive and pertinent negatives as per HPI. All other systems are reviewed and negative. Current Outpatient Medications:     predniSONE (DELTASONE) 10 MG tablet, Take 4 tablets by mouth daily for 3 days, THEN 3 tablets daily for 3 days, THEN 2 tablets daily for 3 days, THEN 1 tablet daily for 3 days. , Disp: 30 tablet, Rfl: 0    levothyroxine (SYNTHROID) 50 MCG tablet, Take 1 tablet by mouth daily, Disp: , Rfl:     folic acid (FOLVITE) 1 MG tablet, Take 1 tablet by mouth daily, Disp: , Rfl:     losartan (COZAAR) 25 MG tablet, TAKE 1 TABLET BY MOUTH ONCE DAILY FOR 90 DAYS, Disp: , Rfl:     meloxicam (MOBIC) 15 MG tablet, , Disp: , Rfl:     cycloSPORINE (RESTASIS) 0.05 % ophthalmic emulsion, , Disp: , Rfl:     ALPRAZolam (XANAX) 0.5 MG tablet, TAKE 1 TABLET BY MOUTH ONCE DAILY, Disp: , Rfl:     omeprazole (PRILOSEC) 40 MG delayed release capsule, Take 1 capsule by mouth daily, Disp: 30 capsule, Rfl: 5    buPROPion (WELLBUTRIN SR) 200 MG extended release tablet, Take 1 tablet by mouth 2 times daily TAKE 1 TABLET PO 1 TWICE DAILY, Disp: 60 tablet, Rfl: 5    albuterol sulfate  (90 Base) MCG/ACT inhaler, Inhale 2 puffs into the lungs every 6 hours as needed for Wheezing, Disp: 1

## 2024-08-29 ENCOUNTER — OFFICE VISIT (OUTPATIENT)
Dept: FAMILY MEDICINE CLINIC | Age: 44
End: 2024-08-29

## 2024-08-29 VITALS
WEIGHT: 224 LBS | HEIGHT: 71 IN | SYSTOLIC BLOOD PRESSURE: 144 MMHG | HEART RATE: 90 BPM | BODY MASS INDEX: 31.36 KG/M2 | TEMPERATURE: 98.5 F | DIASTOLIC BLOOD PRESSURE: 88 MMHG | OXYGEN SATURATION: 98 % | RESPIRATION RATE: 18 BRPM

## 2024-08-29 DIAGNOSIS — M54.50 ACUTE MIDLINE LOW BACK PAIN WITHOUT SCIATICA: Primary | ICD-10-CM

## 2024-08-29 LAB
BILIRUBIN, POC: NORMAL
BLOOD URINE, POC: NORMAL
CLARITY, POC: CLEAR
COLOR, POC: YELLOW
GLUCOSE URINE, POC: NORMAL
KETONES, POC: NORMAL
LEUKOCYTE EST, POC: NORMAL
NITRITE, POC: NORMAL
PH, POC: 7
PROTEIN, POC: NORMAL
SPECIFIC GRAVITY, POC: 1.02
UROBILINOGEN, POC: NORMAL

## 2024-08-29 RX ORDER — TRIAMCINOLONE ACETONIDE 40 MG/ML
40 INJECTION, SUSPENSION INTRA-ARTICULAR; INTRAMUSCULAR ONCE
Status: COMPLETED | OUTPATIENT
Start: 2024-08-29 | End: 2024-08-29

## 2024-08-29 RX ORDER — CYCLOBENZAPRINE HCL 10 MG
10 TABLET ORAL EVERY 8 HOURS PRN
Qty: 15 TABLET | Refills: 0 | Status: SHIPPED | OUTPATIENT
Start: 2024-08-29

## 2024-08-29 RX ORDER — METHYLPREDNISOLONE 4 MG
TABLET, DOSE PACK ORAL
Qty: 1 KIT | Refills: 0 | Status: SHIPPED | OUTPATIENT
Start: 2024-08-29

## 2024-08-29 RX ADMIN — TRIAMCINOLONE ACETONIDE 40 MG: 40 INJECTION, SUSPENSION INTRA-ARTICULAR; INTRAMUSCULAR at 09:43

## 2024-08-29 NOTE — PROGRESS NOTES
24  Marisel Duffy : 1980 Sex: female  Age 44 y.o.    Subjective:  Chief Complaint   Patient presents with    Back Pain       HPI:   Marisel Duffy , 44 y.o. female presents to the clinic for evaluation of lower back pain x 4 days. The patient denies known injury and denies history of back problems. The patient reports symptoms developed later in the day after weeding. Pt states the pain is worse with movement. The patient has taken Aleve for symptoms.  The patient reports worsening symptoms over time. Pt denies any radiating pain, loss of sensation or strength of extremities, bowel/bladder incontinence, and hematuria. The patient also denies headache, fever, chest pain, abdominal pain, shortness of breath, and nausea / vomiting / diarrhea.    ROS:   Unless otherwise stated in this report the patient's positive and negative responses for review of systems for constitutional, eyes, ENT, cardiovascular, respiratory, gastrointestinal, neurological, , musculoskeletal, and integument systems and related systems to the presenting problem are either stated in the history of present illness or were not pertinent or were negative for the symptoms and/or complaints related to the presenting medical problem.  Positives and pertinent negatives as per HPI.  All others reviewed and are negative.      PMH:     Past Medical History:   Diagnosis Date    Acute headache     Anemia     Anxiety     Asthma     GERD (gastroesophageal reflux disease)     Hyperlipidemia     Hypertension     Secondary hypertension     Thyroid disorder     Venous insufficiency     Vitamin D deficiency        History reviewed. No pertinent surgical history.    Family History   Problem Relation Age of Onset    Heart Disease Paternal Grandfather     Bleeding Prob Brother         MTHFR       Medications:     Current Outpatient Medications:     methylPREDNISolone (MEDROL DOSEPACK) 4 MG tablet, Take by mouth., Disp: 1 kit, Rfl: 0

## 2025-02-26 ENCOUNTER — OFFICE VISIT (OUTPATIENT)
Dept: FAMILY MEDICINE CLINIC | Age: 45
End: 2025-02-26
Payer: COMMERCIAL

## 2025-02-26 VITALS
SYSTOLIC BLOOD PRESSURE: 146 MMHG | WEIGHT: 207 LBS | DIASTOLIC BLOOD PRESSURE: 90 MMHG | OXYGEN SATURATION: 97 % | HEIGHT: 71 IN | HEART RATE: 89 BPM | RESPIRATION RATE: 18 BRPM | BODY MASS INDEX: 28.98 KG/M2 | TEMPERATURE: 97.8 F

## 2025-02-26 DIAGNOSIS — J40 SINOBRONCHITIS: Primary | ICD-10-CM

## 2025-02-26 DIAGNOSIS — J32.9 SINOBRONCHITIS: Primary | ICD-10-CM

## 2025-02-26 PROCEDURE — 1036F TOBACCO NON-USER: CPT

## 2025-02-26 PROCEDURE — 3080F DIAST BP >= 90 MM HG: CPT

## 2025-02-26 PROCEDURE — 99213 OFFICE O/P EST LOW 20 MIN: CPT

## 2025-02-26 PROCEDURE — G8427 DOCREV CUR MEDS BY ELIG CLIN: HCPCS

## 2025-02-26 PROCEDURE — 3077F SYST BP >= 140 MM HG: CPT

## 2025-02-26 PROCEDURE — G8417 CALC BMI ABV UP PARAM F/U: HCPCS

## 2025-02-26 RX ORDER — GUAIFENESIN 600 MG/1
600 TABLET, EXTENDED RELEASE ORAL 2 TIMES DAILY
Qty: 30 TABLET | Refills: 0 | Status: SHIPPED | OUTPATIENT
Start: 2025-02-26 | End: 2025-03-13

## 2025-02-26 RX ORDER — PREDNISONE 20 MG/1
20 TABLET ORAL 2 TIMES DAILY
Qty: 10 TABLET | Refills: 0 | Status: SHIPPED | OUTPATIENT
Start: 2025-02-26 | End: 2025-03-03

## 2025-02-26 RX ORDER — DOXYCYCLINE 100 MG/1
100 CAPSULE ORAL 2 TIMES DAILY
Qty: 20 CAPSULE | Refills: 0 | Status: SHIPPED | OUTPATIENT
Start: 2025-02-26 | End: 2025-03-08

## 2025-02-26 NOTE — PROGRESS NOTES
rales, or rhonchi  Heart:  Regular rate and rhythm, normal heart sounds, without pathological murmurs, ectopy, gallops, or rubs.  Skin:  Normal turgor.  Warm, dry, without visible rash.  Neurological:  Alert and oriented.  Motor functions intact.  Responds to verbal commands.     Lab / Imaging Results   (All laboratory and radiology results have been personally reviewed by myself)  Labs:  No results found for this visit on 02/26/25.    Imaging:  All Radiology results interpreted by Radiologist unless otherwise noted.      Assessment / Plan     Impression(s):  Marisel was seen today for congestion.    Diagnoses and all orders for this visit:    Sinobronchitis  -     doxycycline hyclate (VIBRAMYCIN) 100 MG capsule; Take 1 capsule by mouth 2 times daily for 10 days  -     predniSONE (DELTASONE) 20 MG tablet; Take 1 tablet by mouth 2 times daily for 5 days  -     guaiFENesin (MUCINEX) 600 MG extended release tablet; Take 1 tablet by mouth 2 times daily for 15 days      Disposition:  Disposition: Discharge to home.    Vital signs, past medical history, medications, and allergies reviewed at visit.    Increase fluids and rest. Additional symptomatic relief discussed.  Will treat for sinobronchitis today.  Script written for doxycycline, prednisone and Mucinex ER, side effects discussed.      Follow up PCP no improvement after 7-10 days of symptoms. ED sooner if symptoms worsen or change. Red flag symptoms discussed. Pt is in agreement with this care plan. All questions answered.    Ivet Westfall, RACHID - CNP    **This report was transcribed using voice recognition software. Every effort was made to ensure accuracy; however, inadvertent computerized transcription errors may be present.